# Patient Record
Sex: FEMALE | Race: WHITE | NOT HISPANIC OR LATINO | URBAN - METROPOLITAN AREA
[De-identification: names, ages, dates, MRNs, and addresses within clinical notes are randomized per-mention and may not be internally consistent; named-entity substitution may affect disease eponyms.]

---

## 2024-08-30 ENCOUNTER — HOSPITAL ENCOUNTER (OUTPATIENT)
Dept: RADIOLOGY | Facility: EXTERNAL LOCATION | Age: 34
Discharge: HOME | End: 2024-08-30

## 2024-08-30 DIAGNOSIS — R05.9 COUGH, UNSPECIFIED TYPE: ICD-10-CM

## 2025-01-24 ENCOUNTER — APPOINTMENT (OUTPATIENT)
Dept: OBSTETRICS AND GYNECOLOGY | Facility: CLINIC | Age: 35
End: 2025-01-24
Payer: COMMERCIAL

## 2025-02-14 ENCOUNTER — APPOINTMENT (OUTPATIENT)
Dept: OBSTETRICS AND GYNECOLOGY | Facility: CLINIC | Age: 35
End: 2025-02-14
Payer: COMMERCIAL

## 2025-02-14 VITALS
BODY MASS INDEX: 40.78 KG/M2 | DIASTOLIC BLOOD PRESSURE: 84 MMHG | WEIGHT: 216 LBS | SYSTOLIC BLOOD PRESSURE: 138 MMHG | HEIGHT: 61 IN

## 2025-02-14 DIAGNOSIS — Z12.4 CERVICAL CANCER SCREENING: ICD-10-CM

## 2025-02-14 DIAGNOSIS — Z01.419 WELL WOMAN EXAM: Primary | ICD-10-CM

## 2025-02-14 DIAGNOSIS — E28.2 PCOS (POLYCYSTIC OVARIAN SYNDROME): ICD-10-CM

## 2025-02-14 DIAGNOSIS — N94.6 DYSMENORRHEA: ICD-10-CM

## 2025-02-14 PROCEDURE — 88175 CYTOPATH C/V AUTO FLUID REDO: CPT

## 2025-02-14 PROCEDURE — 87624 HPV HI-RISK TYP POOLED RSLT: CPT

## 2025-02-14 RX ORDER — NORGESTIMATE AND ETHINYL ESTRADIOL 7DAYSX3 LO
1 KIT ORAL DAILY
Qty: 28 TABLET | Refills: 11 | Status: SHIPPED | OUTPATIENT
Start: 2025-02-14 | End: 2026-02-14

## 2025-02-14 NOTE — PROGRESS NOTES
Subjective   Patient ID: Esther Nicole is a 34 y.o. female who presents for Well woman visit.  New patient for annual exam.  34 years old.  No relationship.  Boyfriend.  No pregnancies.  No plans to conceive.  Using withdrawal.  No meds.  She does occasionally vape    Cycles are usually regular.  She works in the dental field.    She had concerns about PCOS, endometriosis, ruptured ovarian cyst.  She was last on birth control pills 5 years ago    On exam breast abdominal pelvic exam performed.  Pap smear obtained.  We reviewed that all of these could be addressed by low-dose oral contraceptives.  Discussed the importance of discontinuing vaping.  Prescription for Tri-Sprintec called in.  Start first day of her next menses.  Reviewed side effects.  Follow-up 3 months.    Well woman exam, concerns regarding PCOS endometriosis and history of ruptured ovarian cysts.  Initiate low-dose oral contraceptives        Review of Systems   All other systems reviewed and are negative.      Objective   Physical Exam  Constitutional:       Appearance: Normal appearance. She is obese.   Chest:   Breasts:     Right: Normal.      Left: Normal.   Genitourinary:     General: Normal vulva.      Vagina: Normal.      Cervix: Normal.      Uterus: Normal.       Adnexa: Right adnexa normal and left adnexa normal.   Neurological:      Mental Status: She is alert.         Assessment/Plan   Well woman exam.  No plans to conceive.  Pap smear obtained.  Concerns for PCOS, endometriosis, ruptured ovarian cyst.  Initiate Tri-Sprintec.  Reviewed side effects.  Follow-up 3 months         Ras Jefferson MD 02/14/25 11:05 AM

## 2025-02-26 ENCOUNTER — OFFICE VISIT (OUTPATIENT)
Dept: URGENT CARE | Age: 35
End: 2025-02-26
Payer: COMMERCIAL

## 2025-02-26 VITALS
WEIGHT: 210 LBS | RESPIRATION RATE: 16 BRPM | OXYGEN SATURATION: 100 % | TEMPERATURE: 98.2 F | HEART RATE: 76 BPM | HEIGHT: 61 IN | SYSTOLIC BLOOD PRESSURE: 134 MMHG | BODY MASS INDEX: 39.65 KG/M2 | DIASTOLIC BLOOD PRESSURE: 84 MMHG

## 2025-02-26 DIAGNOSIS — M54.12 CERVICAL RADICULOPATHY: Primary | ICD-10-CM

## 2025-02-26 DIAGNOSIS — M62.830 SPASM OF RIGHT TRAPEZIUS MUSCLE: ICD-10-CM

## 2025-02-26 PROCEDURE — 3008F BODY MASS INDEX DOCD: CPT | Performed by: PHYSICIAN ASSISTANT

## 2025-02-26 PROCEDURE — 99213 OFFICE O/P EST LOW 20 MIN: CPT | Performed by: PHYSICIAN ASSISTANT

## 2025-02-26 PROCEDURE — 96372 THER/PROPH/DIAG INJ SC/IM: CPT | Performed by: PHYSICIAN ASSISTANT

## 2025-02-26 RX ORDER — BACLOFEN 10 MG/1
10 TABLET ORAL 3 TIMES DAILY
Qty: 15 TABLET | Refills: 0 | Status: SHIPPED | OUTPATIENT
Start: 2025-02-26 | End: 2025-03-03

## 2025-02-26 RX ORDER — PREDNISONE 10 MG/1
TABLET ORAL
Qty: 30 TABLET | Refills: 0 | Status: SHIPPED | OUTPATIENT
Start: 2025-02-26

## 2025-02-26 RX ORDER — KETOROLAC TROMETHAMINE 30 MG/ML
30 INJECTION, SOLUTION INTRAMUSCULAR; INTRAVENOUS ONCE
Status: COMPLETED | OUTPATIENT
Start: 2025-02-26 | End: 2025-02-26

## 2025-02-26 RX ADMIN — KETOROLAC TROMETHAMINE 30 MG: 30 INJECTION, SOLUTION INTRAMUSCULAR; INTRAVENOUS at 10:42

## 2025-02-26 ASSESSMENT — PAIN SCALES - GENERAL: PAINLEVEL_OUTOF10: 10-WORST PAIN EVER

## 2025-02-26 ASSESSMENT — ENCOUNTER SYMPTOMS: BACK PAIN: 1

## 2025-02-26 NOTE — PROGRESS NOTES
"Subjective   Patient ID: Esther Nicole is a 34 y.o. female. They present today with a chief complaint of Back Pain (Patient states she has back pain for 2 months but the last 3 weeks its been at a 10/10 pain. ).    History of Present Illness  Patient is a pleasant 34-year-old white female, no significant past medical history, presented to clinic for to complaint of acute on chronic neck pain.  Patient is reporting approximately 2 years ago she was having some neck pain with radiation to her right upper extremity.  States she had an MRI and was diagnosed with degenerative disc disease.  She presented to clinic today out of concern for 2-week history of worsening pain associated with her right neck/upper back that does radiate down her right upper extremity.  She denies any new trauma or injury.  States this pain is consistent with her previous pain however it is lasting longer than usual.  She has been icing and using heat ibuprofen and Tylenol at home with only mild short-term relief.  She is presented to clinic for further evaluation.  States she does have an appointment with her primary care doctor in a couple of weeks however feels like she was unable to wait.  No further complaints at this time.      Back Pain        Past Medical History  Allergies as of 02/26/2025    (No Known Allergies)       (Not in a hospital admission)         No past medical history on file.    No past surgical history on file.     reports that she has never smoked. She uses smokeless tobacco. She reports current alcohol use. She reports that she does not use drugs.    Review of Systems  Review of Systems   Musculoskeletal:  Positive for back pain.                                  Objective    Vitals:    02/26/25 1027   BP: 134/84   BP Location: Right arm   Patient Position: Sitting   Pulse: 76   Resp: 16   Temp: 36.8 °C (98.2 °F)   TempSrc: Oral   SpO2: 100%   Weight: 95.3 kg (210 lb)   Height: 1.549 m (5' 1\")     Patient's last " menstrual period was 02/10/2025.    Physical Exam  General: Vitals Noted. No distress. Normocephalic.     HEENT: TMs normal, EOMI, normal conjunctiva, patent nares, Normal OP    Neck: Supple with no adenopathy.  There is no midline tenderness to palpations at the C, T, L, or S spines with no underlying palpable deformities or step-offs.  She does have tenderness to palpation in the right cervical paraspinal musculature and into the right superior trapezius with palpable hypertonicity.  Tenderness does extend along the right medial scapular border and into the proximal latissimus.  Neurovascular tact distally cap refill less than 2 seconds full  strength and full strength with shoulder shrug.    Cardiac: Regular Rate and Rhythm. No murmur.     Pulmonary: Equal breath sounds bilaterally. No wheezes, rhonchi, or rales.    Abdomen: Soft, non-tender, with normal bowel sounds.     Musculoskeletal: Moves all extremities, no effusion, no edema.     Skin: No obvious rashes.  Procedures    Point of Care Test & Imaging Results from this visit    No results found.    Diagnostic study results (if any) were reviewed by Stephen Avila PA-C.    Assessment/Plan   Allergies, medications, history, and pertinent labs/EKGs/Imaging reviewed by Stephen Avila PA-C.     Medical Decision Making  Patient was seen eval in the clinic for chief complaint of acute on chronic right sided neck pain with associated radiculopathy.  On exam patient is nontoxic well-appearing respect comfortably no acute distress.  Vital signs are stable, afebrile.  Chest is clear, heart is regular, belly is obese and soft and nontender.  Evaluation of back and neck as above concerning for potentially acute on chronic cervical radiculopathy however I do feel there is a concurrent spasm of the trapezius muscle given the palpable hypertonicity and worsening pain.  Feel this is likely secondary to compensation over the past couple of weeks due to her  underlying chronic pain.  She provided with 30 mg Toradol IM in the clinic.  Will be discharged home with a extended course of prednisone taper and advised to continue alternating Tylenol or Profen as needed.  Also provided short course of muscle relaxer.  Advised to follow with her primary care physician as planned.  Advise she may report to the ED for any new or worsening symptoms.  Reviewed my impression, plan, strict return was report to ED precautions with the patient.  She expresses understanding and agreement plan of care.    Orders and Diagnoses  Diagnoses and all orders for this visit:  Cervical radiculopathy  -     ketorolac (Toradol) injection 30 mg  -     baclofen (Lioresal) 10 mg tablet; Take 1 tablet (10 mg) by mouth 3 times a day for 5 days.  -     predniSONE (Deltasone) 10 mg tablet; Take 4 tablets at 1 time per day for 3 days then take 3 tablets at 1 time per day for 3 days then take 2 tablets 1 time per day for 3 days then take 1 tablet 1 time per day for 3 days  Spasm of right trapezius muscle  -     ketorolac (Toradol) injection 30 mg  -     baclofen (Lioresal) 10 mg tablet; Take 1 tablet (10 mg) by mouth 3 times a day for 5 days.  -     predniSONE (Deltasone) 10 mg tablet; Take 4 tablets at 1 time per day for 3 days then take 3 tablets at 1 time per day for 3 days then take 2 tablets 1 time per day for 3 days then take 1 tablet 1 time per day for 3 days        Medical Admin Record  Administrations This Visit       ketorolac (Toradol) injection 30 mg       Admin Date  02/26/2025 Action  Given Dose  30 mg Route  intramuscular Documented By  Angela Ingram LPN                    Follow Up Instructions  No follow-ups on file.    Patient disposition: Home    Electronically signed by Stephen Avila PA-C  10:46 AM

## 2025-03-10 PROBLEM — J45.40 MODERATE PERSISTENT ASTHMA WITHOUT COMPLICATION (HHS-HCC): Status: ACTIVE | Noted: 2025-03-10

## 2025-03-10 PROBLEM — M54.12 CERVICAL RADICULOPATHY: Status: ACTIVE | Noted: 2025-03-10

## 2025-03-10 PROBLEM — M50.322 DEGENERATION OF C5-C6 INTERVERTEBRAL DISC: Status: ACTIVE | Noted: 2025-03-10

## 2025-03-10 PROBLEM — L20.9 ATOPIC DERMATITIS: Status: ACTIVE | Noted: 2025-03-10

## 2025-03-10 PROBLEM — F41.9 ANXIETY: Status: ACTIVE | Noted: 2025-03-10

## 2025-03-10 PROBLEM — A60.00 GENITAL HERPES SIMPLEX: Status: ACTIVE | Noted: 2025-03-10

## 2025-03-10 ASSESSMENT — ENCOUNTER SYMPTOMS
ABDOMINAL PAIN: 0
DIZZINESS: 0
BLOOD IN STOOL: 0
LIGHT-HEADEDNESS: 0
SHORTNESS OF BREATH: 0
HEADACHES: 0

## 2025-03-10 NOTE — PROGRESS NOTES
Subjective   Patient ID: Esther Nicole is a 34 y.o. female who presents for Annual Exam (Pt is here for physical / nr) and Shoulder Pain (Pt would like to discuss right shoulder pain, for months / nr).    HPI   PMHx significant for asthma, genital herpes, ?PCOS, ?endometriosis    Has been experiencing R arm pain x 2mos. No change in physical activity. No recent falls or injuries. Just woke up one morning with the pain. Pain in the neck is severe, describes as stabbing, radiates down her arm. Has numbness down her entire arm as well as first three fingers. Alternating tylenol + ibuprofen w/minimal relief. No fevers, redness, warmth in the area.    Went to  and was rx'd muscle relaxer + prednisone. Given 30mg injection of toradol w/no relief.    HM: pap 2/14/25    Review of Systems   HENT:  Negative for hearing loss.    Eyes:  Negative for visual disturbance.   Respiratory:  Negative for shortness of breath.    Cardiovascular:  Negative for chest pain.   Gastrointestinal:  Negative for abdominal pain and blood in stool.   Genitourinary:  Negative for vaginal bleeding.   Musculoskeletal:  Positive for arthralgias and neck pain.   Neurological:  Negative for dizziness, light-headedness and headaches.       Objective   /82   Pulse 91   Wt 99.1 kg (218 lb 6.4 oz)   LMP 02/10/2025   SpO2 98%   BMI 41.27 kg/m²     Physical Exam  Vitals reviewed.   Constitutional:       Appearance: Normal appearance.   HENT:      Head: Normocephalic and atraumatic.      Right Ear: Tympanic membrane and ear canal normal.      Left Ear: Tympanic membrane and ear canal normal.      Nose: Nose normal.      Mouth/Throat:      Mouth: Mucous membranes are moist.      Pharynx: No oropharyngeal exudate.   Eyes:      Extraocular Movements: Extraocular movements intact.      Conjunctiva/sclera: Conjunctivae normal.      Pupils: Pupils are equal, round, and reactive to light.   Cardiovascular:      Rate and Rhythm: Normal rate and  regular rhythm.      Heart sounds: Normal heart sounds.   Pulmonary:      Effort: Pulmonary effort is normal.      Breath sounds: Normal breath sounds. No wheezing.   Abdominal:      General: There is no distension.      Palpations: Abdomen is soft.      Tenderness: There is no abdominal tenderness.   Musculoskeletal:         General: No tenderness.      Cervical back: Normal range of motion and neck supple.      Comments: Decreased ROM of the neck, difficulty w/flexion + R lateral rotation. TTP R posterior shoulder blade. 4/5 strength b/l upper extremities   Skin:     General: Skin is warm and dry.      Findings: No rash.   Neurological:      General: No focal deficit present.      Mental Status: She is alert. Mental status is at baseline.   Psychiatric:         Mood and Affect: Mood normal.         Assessment/Plan   Problem List Items Addressed This Visit             ICD-10-CM    Cervical radiculopathy M54.12    Relevant Medications    gabapentin (Neurontin) 300 mg capsule    Other Relevant Orders    MR cervical spine wo IV contrast    Referral to Physical Therapy     Other Visit Diagnoses         Codes    Routine medical exam    -  Primary Z00.00    Screening for diabetes mellitus     Z13.1    Relevant Orders    Comprehensive Metabolic Panel    Hemoglobin A1C    Lipid screening     Z13.220    Relevant Orders    Lipid Panel    Vitamin D deficiency     E55.9    Relevant Orders    Vitamin D 25-Hydroxy,Total (for eval of Vitamin D levels)    Screening for hematuria or proteinuria     Z13.89    Relevant Orders    Urinalysis with Reflex Culture and Microscopic    Paresthesias     R20.2    Relevant Orders    MR cervical spine wo IV contrast    Neck pain     M54.2    Relevant Orders    MR cervical spine wo IV contrast    Referral to Physical Therapy    Right arm pain     M79.601    Relevant Orders    MR cervical spine wo IV contrast    Referral to Physical Therapy

## 2025-03-14 ENCOUNTER — OFFICE VISIT (OUTPATIENT)
Dept: PRIMARY CARE | Facility: CLINIC | Age: 35
End: 2025-03-14
Payer: COMMERCIAL

## 2025-03-14 VITALS
BODY MASS INDEX: 41.27 KG/M2 | WEIGHT: 218.4 LBS | HEART RATE: 91 BPM | SYSTOLIC BLOOD PRESSURE: 124 MMHG | OXYGEN SATURATION: 98 % | DIASTOLIC BLOOD PRESSURE: 82 MMHG

## 2025-03-14 DIAGNOSIS — M54.2 NECK PAIN: ICD-10-CM

## 2025-03-14 DIAGNOSIS — Z00.00 ROUTINE MEDICAL EXAM: Primary | ICD-10-CM

## 2025-03-14 DIAGNOSIS — M79.601 RIGHT ARM PAIN: ICD-10-CM

## 2025-03-14 DIAGNOSIS — R20.2 PARESTHESIAS: ICD-10-CM

## 2025-03-14 DIAGNOSIS — E55.9 VITAMIN D DEFICIENCY: ICD-10-CM

## 2025-03-14 DIAGNOSIS — Z13.220 LIPID SCREENING: ICD-10-CM

## 2025-03-14 DIAGNOSIS — Z13.1 SCREENING FOR DIABETES MELLITUS: ICD-10-CM

## 2025-03-14 DIAGNOSIS — M54.12 CERVICAL RADICULOPATHY: ICD-10-CM

## 2025-03-14 DIAGNOSIS — Z13.89 SCREENING FOR HEMATURIA OR PROTEINURIA: ICD-10-CM

## 2025-03-14 PROCEDURE — 99395 PREV VISIT EST AGE 18-39: CPT | Performed by: PHYSICIAN ASSISTANT

## 2025-03-14 RX ORDER — GABAPENTIN 300 MG/1
300 CAPSULE ORAL 2 TIMES DAILY
Qty: 60 CAPSULE | Refills: 2 | Status: SHIPPED | OUTPATIENT
Start: 2025-03-14 | End: 2025-09-10

## 2025-03-14 ASSESSMENT — COLUMBIA-SUICIDE SEVERITY RATING SCALE - C-SSRS
1. IN THE PAST MONTH, HAVE YOU WISHED YOU WERE DEAD OR WISHED YOU COULD GO TO SLEEP AND NOT WAKE UP?: NO
6. HAVE YOU EVER DONE ANYTHING, STARTED TO DO ANYTHING, OR PREPARED TO DO ANYTHING TO END YOUR LIFE?: NO
2. HAVE YOU ACTUALLY HAD ANY THOUGHTS OF KILLING YOURSELF?: NO

## 2025-03-14 ASSESSMENT — PAIN SCALES - GENERAL: PAINLEVEL_OUTOF10: 10-WORST PAIN EVER

## 2025-03-14 ASSESSMENT — PATIENT HEALTH QUESTIONNAIRE - PHQ9
2. FEELING DOWN, DEPRESSED OR HOPELESS: NOT AT ALL
1. LITTLE INTEREST OR PLEASURE IN DOING THINGS: NOT AT ALL
SUM OF ALL RESPONSES TO PHQ9 QUESTIONS 1 AND 2: 0

## 2025-03-14 ASSESSMENT — ENCOUNTER SYMPTOMS
ARTHRALGIAS: 1
NECK PAIN: 1

## 2025-03-28 ENCOUNTER — HOSPITAL ENCOUNTER (OUTPATIENT)
Dept: RADIOLOGY | Facility: CLINIC | Age: 35
Discharge: HOME | End: 2025-03-28
Payer: COMMERCIAL

## 2025-03-28 DIAGNOSIS — M79.601 RIGHT ARM PAIN: ICD-10-CM

## 2025-03-28 DIAGNOSIS — M54.2 NECK PAIN: ICD-10-CM

## 2025-03-28 DIAGNOSIS — R20.2 PARESTHESIAS: ICD-10-CM

## 2025-03-28 DIAGNOSIS — M54.12 CERVICAL RADICULOPATHY: ICD-10-CM

## 2025-03-28 PROCEDURE — 72141 MRI NECK SPINE W/O DYE: CPT

## 2025-03-29 LAB
25(OH)D3+25(OH)D2 SERPL-MCNC: 26 NG/ML (ref 30–100)
ALBUMIN SERPL-MCNC: 4.4 G/DL (ref 3.6–5.1)
ALP SERPL-CCNC: 60 U/L (ref 31–125)
ALT SERPL-CCNC: 19 U/L (ref 6–29)
ANION GAP SERPL CALCULATED.4IONS-SCNC: 7 MMOL/L (CALC) (ref 7–17)
APPEARANCE UR: CLEAR
AST SERPL-CCNC: 12 U/L (ref 10–30)
BACTERIA #/AREA URNS HPF: ABNORMAL /HPF
BACTERIA UR CULT: ABNORMAL
BILIRUB SERPL-MCNC: 0.8 MG/DL (ref 0.2–1.2)
BILIRUB UR QL STRIP: NEGATIVE
BUN SERPL-MCNC: 17 MG/DL (ref 7–25)
CALCIUM SERPL-MCNC: 9.4 MG/DL (ref 8.6–10.2)
CHLORIDE SERPL-SCNC: 102 MMOL/L (ref 98–110)
CHOLEST SERPL-MCNC: 236 MG/DL
CHOLEST/HDLC SERPL: 5 (CALC)
CO2 SERPL-SCNC: 30 MMOL/L (ref 20–32)
COLOR UR: YELLOW
CREAT SERPL-MCNC: 0.68 MG/DL (ref 0.5–0.97)
EGFRCR SERPLBLD CKD-EPI 2021: 119 ML/MIN/1.73M2
EST. AVERAGE GLUCOSE BLD GHB EST-MCNC: 105 MG/DL
EST. AVERAGE GLUCOSE BLD GHB EST-SCNC: 5.8 MMOL/L
GLUCOSE SERPL-MCNC: 84 MG/DL (ref 65–99)
GLUCOSE UR QL STRIP: NEGATIVE
HBA1C MFR BLD: 5.3 % OF TOTAL HGB
HDLC SERPL-MCNC: 47 MG/DL
HGB UR QL STRIP: ABNORMAL
HYALINE CASTS #/AREA URNS LPF: ABNORMAL /LPF
KETONES UR QL STRIP: NEGATIVE
LDLC SERPL CALC-MCNC: 158 MG/DL (CALC)
LEUKOCYTE ESTERASE UR QL STRIP: NEGATIVE
NITRITE UR QL STRIP: NEGATIVE
NONHDLC SERPL-MCNC: 189 MG/DL (CALC)
PH UR STRIP: ABNORMAL [PH] (ref 5–8)
POTASSIUM SERPL-SCNC: 4.3 MMOL/L (ref 3.5–5.3)
PROT SERPL-MCNC: 6.7 G/DL (ref 6.1–8.1)
PROT UR QL STRIP: NEGATIVE
RBC #/AREA URNS HPF: ABNORMAL /HPF
SERVICE CMNT-IMP: ABNORMAL
SODIUM SERPL-SCNC: 139 MMOL/L (ref 135–146)
SP GR UR STRIP: 1.02 (ref 1–1.03)
SQUAMOUS #/AREA URNS HPF: ABNORMAL /HPF
TRIGL SERPL-MCNC: 179 MG/DL
WBC #/AREA URNS HPF: ABNORMAL /HPF

## 2025-03-31 ENCOUNTER — PATIENT MESSAGE (OUTPATIENT)
Dept: PRIMARY CARE | Facility: CLINIC | Age: 35
End: 2025-03-31
Payer: COMMERCIAL

## 2025-03-31 ENCOUNTER — TELEPHONE (OUTPATIENT)
Dept: PRIMARY CARE | Facility: CLINIC | Age: 35
End: 2025-03-31
Payer: COMMERCIAL

## 2025-03-31 DIAGNOSIS — M50.20 CERVICAL DISC DISPLACEMENT: ICD-10-CM

## 2025-03-31 DIAGNOSIS — M54.12 CERVICAL RADICULOPATHY: Primary | ICD-10-CM

## 2025-03-31 NOTE — TELEPHONE ENCOUNTER
Relay message to pt through [facility]. Pt replied back stating that she has been in pain for months, the pain management Dr cannot get her in until May. Pt is still having horrible pains and barely making it everyday with ibuprofen and 600mg daily of gabapentin.

## 2025-03-31 NOTE — TELEPHONE ENCOUNTER
Labs:  Cholesterol is elevated but no need for medication at this time. Does need to watch intake of greasy foods, red meat, processed foods, sugar, starchy carbs. Mediterranean diet is best. Recommend increasing exercise to 150 mins per week.    Vitamin D is low - recommend 1000IU vitamin D3 daily    Urinalysis showed 1+ blood but there were no actually RBC in the urine, so nothing to worry about at this time.    A1c stable, not in prediabetes range    Normal glucose, kidney/liver function, and electrolytes      Imaging:  Does show bulging discs which is likely causing her arm pain. Recommend FU with pain management. Hold off on starting physical therapy until she sees them and they are in agreement with plan. Referral placed.

## 2025-03-31 NOTE — TELEPHONE ENCOUNTER
I can refer her to spinal surgery to see if they can get her in sooner but there's not much more I can do. May need to travel further to be seen sooner - would need to discuss this with centralized scheduling. She can also put herself on a cancellation list for pain management.

## 2025-05-02 ENCOUNTER — APPOINTMENT (OUTPATIENT)
Dept: PAIN MEDICINE | Facility: CLINIC | Age: 35
End: 2025-05-02
Payer: COMMERCIAL

## 2025-05-09 ENCOUNTER — APPOINTMENT (OUTPATIENT)
Dept: OBSTETRICS AND GYNECOLOGY | Facility: CLINIC | Age: 35
End: 2025-05-09
Payer: COMMERCIAL

## 2025-05-09 VITALS
HEIGHT: 61 IN | WEIGHT: 222 LBS | DIASTOLIC BLOOD PRESSURE: 82 MMHG | SYSTOLIC BLOOD PRESSURE: 120 MMHG | BODY MASS INDEX: 41.91 KG/M2

## 2025-05-09 DIAGNOSIS — N80.9 ENDOMETRIOSIS: ICD-10-CM

## 2025-05-09 DIAGNOSIS — Z87.42 HISTORY OF OVARIAN CYST: ICD-10-CM

## 2025-05-09 DIAGNOSIS — E28.2 PCOS (POLYCYSTIC OVARIAN SYNDROME): Primary | ICD-10-CM

## 2025-05-09 PROCEDURE — 99213 OFFICE O/P EST LOW 20 MIN: CPT | Performed by: OBSTETRICS & GYNECOLOGY

## 2025-05-09 PROCEDURE — 3008F BODY MASS INDEX DOCD: CPT | Performed by: OBSTETRICS & GYNECOLOGY

## 2025-05-09 RX ORDER — NORGESTIMATE AND ETHINYL ESTRADIOL 7DAYSX3 LO
1 KIT ORAL DAILY
Qty: 28 TABLET | Refills: 11 | Status: SHIPPED | OUTPATIENT
Start: 2025-05-09 | End: 2026-05-09

## 2025-05-09 NOTE — PROGRESS NOTES
"Subjective   Patient ID: Esther Nicole \"Bebe\" is a 34 y.o. female who presents for consult.  Review of last note       Ras Jefferson MD  Physician  Obstetrics     Progress Notes     Signed     Encounter Date: 2/14/2025    Signed     Expand All Collapse All    Subjective  Patient ID: Esther Nicole is a 34 y.o. female who presents for Well woman visit.  New patient for annual exam.  34 years old.  No relationship.  Boyfriend.  No pregnancies.  No plans to conceive.  Using withdrawal.  No meds.  She does occasionally vape     Cycles are usually regular.  She works in the dental field.     She had concerns about PCOS, endometriosis, ruptured ovarian cyst.  She was last on birth control pills 5 years ago     On exam breast abdominal pelvic exam performed.  Pap smear obtained.  We reviewed that all of these could be addressed by low-dose oral contraceptives.  Discussed the importance of discontinuing vaping.  Prescription for Tri-Sprintec called in.  Start first day of her next menses.  Reviewed side effects.  Follow-up 3 months.     Well woman exam, concerns regarding PCOS endometriosis and history of ruptured ovarian cysts.  Initiate low-dose oral contraceptives           Review of Systems   All other systems reviewed and are negative.        Objective  Physical Exam  Constitutional:       Appearance: Normal appearance. She is obese.   Chest:   Breasts:     Right: Normal.      Left: Normal.   Genitourinary:     General: Normal vulva.      Vagina: Normal.      Cervix: Normal.      Uterus: Normal.       Adnexa: Right adnexa normal and left adnexa normal.   Neurological:      Mental Status: She is alert.            Assessment/Plan  Well woman exam.  No plans to conceive.  Pap smear obtained.  Concerns for PCOS, endometriosis, ruptured ovarian cyst.  Initiate Tri-Sprintec.  Reviewed side effects.  Follow-up 3 months        Ras Jefferson MD 02/14/25 11:05 AM           Started on low-dose oral contraceptives.  Her " first cycle was delayed by a week or 2 and had a lot more pain but the cycles are now regulating.  Just finishing her cycle now no missed pills.    We have talked about ovarian cysts preventing them with oral contraceptives, PCOS and obtaining hormonal balance.  Endometriosis..  Comfortable staying on them for now.  No significant side effects.  Follow-up in 6 months        Review of Systems   Genitourinary:  Positive for menstrual problem and pelvic pain.       Objective   Physical Exam  Constitutional:       Appearance: Normal appearance. She is obese.   Neurological:      Mental Status: She is alert.         Assessment/Plan   History of PCOS, ovarian cyst, questionable endometriosis.  Started on OCP.  No significant side effects.  Taking daily.  Refill and follow-up in 6 months         Ras Jefferson MD 05/09/25 10:56 AM

## 2025-05-22 ENCOUNTER — TELEPHONE (OUTPATIENT)
Dept: PRIMARY CARE | Facility: CLINIC | Age: 35
End: 2025-05-22
Payer: COMMERCIAL

## 2025-05-22 DIAGNOSIS — M50.20 CERVICAL DISC DISPLACEMENT: ICD-10-CM

## 2025-05-22 DIAGNOSIS — M54.12 CERVICAL RADICULOPATHY: Primary | ICD-10-CM

## 2025-05-29 ENCOUNTER — OFFICE VISIT (OUTPATIENT)
Dept: ORTHOPEDIC SURGERY | Facility: HOSPITAL | Age: 35
End: 2025-05-29
Payer: COMMERCIAL

## 2025-05-29 ENCOUNTER — HOSPITAL ENCOUNTER (OUTPATIENT)
Dept: RADIOLOGY | Facility: HOSPITAL | Age: 35
Discharge: HOME | End: 2025-05-29
Payer: COMMERCIAL

## 2025-05-29 DIAGNOSIS — M54.12 CERVICAL RADICULOPATHY: ICD-10-CM

## 2025-05-29 DIAGNOSIS — M50.10 HERNIATION OF CERVICAL INTERVERTEBRAL DISC WITH RADICULOPATHY: ICD-10-CM

## 2025-05-29 DIAGNOSIS — M99.41 CONNECTIVE TISSUE STENOSIS OF NEURAL CANAL OF CERVICAL REGION: Primary | ICD-10-CM

## 2025-05-29 PROCEDURE — 72050 X-RAY EXAM NECK SPINE 4/5VWS: CPT | Performed by: RADIOLOGY

## 2025-05-29 PROCEDURE — 99214 OFFICE O/P EST MOD 30 MIN: CPT | Performed by: ORTHOPAEDIC SURGERY

## 2025-05-29 PROCEDURE — 99204 OFFICE O/P NEW MOD 45 MIN: CPT | Performed by: ORTHOPAEDIC SURGERY

## 2025-05-29 PROCEDURE — 72050 X-RAY EXAM NECK SPINE 4/5VWS: CPT

## 2025-05-29 RX ORDER — CEFAZOLIN SODIUM 2 G/50ML
2 SOLUTION INTRAVENOUS ONCE
OUTPATIENT
Start: 2025-05-29 | End: 2025-05-29

## 2025-05-29 NOTE — PROGRESS NOTES
Chief Complaint: Neck pain and right radicular arm pain    HPI: Esther Nicole is a 34 y.o. year old right-hand-dominant female patient otherwise healthy who works as a dental assistant who was seen in our clinic today with referral from Viri Mazariegos for cervical radiculopathy.  She states that her symptoms started in January 2025 where she was having neck pain and pain that radiate down the right arm particularly around the right triceps forearm and periscapular region.  She is also started to develop numbness in her hands more so involving the thumb index and long finger.  She is also noticed that she has been dropping things at work.  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  She spends a lot of time looking down and with this pain she is unable to look down for long peers of time.  Looking down causes sharp pain going down her arm but also down her back.  Otherwise asymptomatic on the left side.  No history of cervical spine surgery.  She did see pain management and had an epidural steroid injection on April 18, 2025 which provided about 3 weeks of relief and then the pain came back.  She is scheduled for physical therapy however she is unable to tolerate it due to so much pain with any range of motion of her neck.  She denies any myelopathic symptoms such as gait instability or frequent falls.    No anticoagulations  She does not smoke tobacco or use nicotine products.    Review of Systems    All other systems have been reviewed and are negative for complaint. All pertinent positive and negative as listed in history of present illness.    Medical History[1]     Surgical History[2]     RX Allergies[3]     Medications Ordered Prior to Encounter[4]         PE:     BMI: 41.95  General: Patient appears well-nourished and well-developed in no acute distress, Alert and Oriented x3  Psych: Pleasant mood and affect  HEENT: Extraocular muscles intact, pupils equal and round. Sclerae anicteric   Cardio:  extremities warm and well perfused  Resp: unlabored symmetric breathing  Skin: no open wounds or rash  Musculoskeletal/Neuro Exam: Normal gait.  She has some mild difficulty with tandem and heel-to-toe gait.  Diffuse tenderness to palpation along the right paraspinal and parascapular region.  Pain and stiffness with cervical range of motion she holds her head in a slightly flexed position because extending her neck causes severe pain down her arm and back..  Positive Spurlings on the right negative on the left.  Positive Lhermitte's Sign    Upper extremity: Negative Tinel negative Armstrong over the right carpal tunnel    Motor: Right upper extremity was 4 out of 5 strength with shoulder abduction, elbow flexion and extension, wrist flexion and extension and  against resistance  Left upper extremity with 5 out of 5 strength with shoulder abduction, elbow flexion and extension, wrist flexion and extension and  against resistance    Sensation to light touch intact along C5-T1 distribution bilaterally but diminished in the right hand along the thumb index and long finger.    Reflex: 2+ brachioradialis and biceps bilaterally    Upper Motor Signs: Negative Seth sign bilaterally    Lower extremity    Motor: Right leg with 5 out of 5 motor strength with hip flexion, knee extension, ankle dorsiflexion plantarflexion EHL against resistance  Left leg with 5 out of 5 motor strength with hip flexion, knee extension, ankle dorsiflexion plantarflexion EHL against resistance    Sensation to light touch intact along L2-S1 distribution bilaterally    2+ patella and Achilles Reflex          Imaging:    I personally reviewed xray of the cervical spine obtained in clinic today. AP, Lateral, flexion and extension xrays were reviewed. No evidence of acute fracture or dislocation. There is focal kyphosis at C5-C6 that does not seem to improve with flexion extension.  There is disc space narrowing at C5-C6 C6-C7.    I reviewed the  MRI of the cervical spine from March 28, 2025.  MRI shows disc bulge and disc protrusion and extrusion at C5-C6 greater on the left than the right causing moderate central canal stenosis and flatting of the cord.  At C6-C7 there is a right sided disc extrusion causing moderate central canal stenosis and flattening of the cord.    A/P: Esther Nicole is a 34 y.o. year old female patient with 6-month history of right radicular arm pain secondary to C5-C6 C6-C7 disc herniation causing moderate canal stenosis as seen on MRI.  She has positive Spurling and positive Lhermitte sign on exam today.  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  She underwent epidural steroid injection which provided only temporary relief.  I do not think that she will be able to tolerate physical therapy due to positive Lhermitte sign and Spurling sign that exacerbates her pain with any movement of her neck.  She holds her neck in a slightly flexed position because of the pain.  Given her physical exam findings with right arm weakness and positive Lhermitte sign and MRI findings for disc herniation with canal stenosis and flatting of the spinal cord I would recommend surgery for C5-C6 C6-C7 anterior cervical discectomy and fusion decompress the neural elements.  I would like for her to get a CT scan of the cervical spine for preoperative planning to make sure that she does not have OPLL or a calcified disc that would prevent me from going anteriorly.  She was given order for this today.    I discussed the risks of surgery which includes but not limited to infection at the surgical site or wound healing requiring additional surgery to clean the infection and long term IV antibiotics. There is risk of blood loss requiring blood transfusion. Risk of dural tear requiring repair and possible continue cerebral spinal fluid leakage and positional headaches. Risk of Nerve  root injury resulting in temporary or permeant weakness, numbness, or radicular pain such as a nerve root palsy. Risk of epidural hematoma and spinal cord compression resulting in weakness in extremity and bowel and bladder disturbances requiring additional surgery to clear out hematoma. Risk of retropharyngeal hematoma causing airway comprise and need for emergent surgery for evacuation. Risk of nonunion or hardware failure requiring additional surgery to correct this. Risk of adjacent level disease in the future requiring additional surgery in the future to address this. Risk of needing to remain intubated after surgery for facial swelling. Risk of dysphagia or difficulty swallowing requiring feeding tube breifly. Risks of developing MI, stroke, DVT, PE during or after surgery. Risk of death. Risk of vascular injury or esophageal injury. Risk of complications from anesthesia requiring prolong intubation. Risk of birgit coronavirus and the complications associated with that while in the hospital.     After our discussion patient elected to proceed with surgery.  Written consent was obtained in clinic today.  She will need preadmission testing.    Surgery will be with Medtronic Zevo plate.  I will be using structural allograft for interbody fusion.  Allograft will be filled with DBM putty.    After our discussion, the patient articulated understanding of the plan and felt that all questions had been answered satisfactorily. The patient was pleased with the visit and very appreciative for the care rendered.    **Please excuse any errors in grammar or translation related to this dictation. Voice recognition software was utilized to prepare this document. **                    Angela Collado MD    Department of Orthopaedic Surgery  [facility]  [email or web address]          [1] History reviewed. No pertinent past medical history.  [2] History  reviewed. No pertinent surgical history.  [3] No Known Allergies  [4]   Current Outpatient Medications on File Prior to Visit   Medication Sig Dispense Refill    gabapentin (Neurontin) 300 mg capsule Take 1 capsule (300 mg) by mouth 2 times a day. 60 capsule 2    norgestimate-ethinyl estradioL (Ortho Tri-Cyclen LO) 0.18/0.215/0.25 mg-0.025 mg tablet Take 1 tablet by mouth once daily. 28 tablet 11    norgestimate-ethinyl estradioL (Ortho Tri-Cyclen LO) 0.18/0.215/0.25 mg-25 mcg tablet Take 1 tablet by mouth once daily. 28 tablet 11     No current facility-administered medications on file prior to visit.

## 2025-05-29 NOTE — LETTER
May 29, 2025     Viri Mazariegos PA-C  [address] [address]  [place] [address]    Patient: Bebe Nicole   YOB: 1990   Date of Visit: 5/29/2025       Dear Dr. Viri Mazariegos PA-C:    Thank you for referring Bebe Nicole to me for evaluation. Below are my notes for this consultation.  If you have questions, please do not hesitate to call me. I look forward to following your patient along with you.       Sincerely,     Angela Collado MD      CC: No Recipients  ______________________________________________________________________________________    Chief Complaint: Neck pain and right radicular arm pain    HPI: Esther Nicole is a 34 y.o. year old right-hand-dominant female patient otherwise healthy who works as a dental assistant who was seen in our clinic today with referral from Viri Mazariegos for cervical radiculopathy.  She states that her symptoms started in January 2025 where she was having neck pain and pain that radiate down the right arm particularly around the right triceps forearm and periscapular region.  She is also started to develop numbness in her hands more so involving the thumb index and long finger.  She is also noticed that she has been dropping things at work.  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  She spends a lot of time looking down and with this pain she is unable to look down for long peers of time.  Looking down causes sharp pain going down her arm but also down her back.  Otherwise asymptomatic on the left side.  No history of cervical spine surgery.  She did see pain management and had an epidural steroid injection on April 18, 2025 which provided about 3 weeks of relief and then the pain came back.  She is scheduled for physical therapy however she is unable to tolerate it due to so much pain with any range of motion of her neck.  She denies any myelopathic symptoms such as gait instability or frequent falls.    No anticoagulations  She  does not smoke tobacco or use nicotine products.    Review of Systems    All other systems have been reviewed and are negative for complaint. All pertinent positive and negative as listed in history of present illness.    Medical History[1]     Surgical History[2]     RX Allergies[3]     Medications Ordered Prior to Encounter[4]         PE:     BMI: 41.95  General: Patient appears well-nourished and well-developed in no acute distress, Alert and Oriented x3  Psych: Pleasant mood and affect  HEENT: Extraocular muscles intact, pupils equal and round. Sclerae anicteric   Cardio: extremities warm and well perfused  Resp: unlabored symmetric breathing  Skin: no open wounds or rash  Musculoskeletal/Neuro Exam: Normal gait.  She has some mild difficulty with tandem and heel-to-toe gait.  Diffuse tenderness to palpation along the right paraspinal and parascapular region.  Pain and stiffness with cervical range of motion she holds her head in a slightly flexed position because extending her neck causes severe pain down her arm and back..  Positive Spurlings on the right negative on the left.  Positive Lhermitte's Sign    Upper extremity: Negative Tinel negative [place] over the right carpal tunnel    Motor: Right upper extremity was 4 out of 5 strength with shoulder abduction, elbow flexion and extension, wrist flexion and extension and  against resistance  Left upper extremity with 5 out of 5 strength with shoulder abduction, elbow flexion and extension, wrist flexion and extension and  against resistance    Sensation to light touch intact along C5-T1 distribution bilaterally but diminished in the right hand along the thumb index and long finger.    Reflex: 2+ brachioradialis and biceps bilaterally    Upper Motor Signs: Negative Seth sign bilaterally    Lower extremity    Motor: Right leg with 5 out of 5 motor strength with hip flexion, knee extension, ankle dorsiflexion plantarflexion EHL against  resistance  Left leg with 5 out of 5 motor strength with hip flexion, knee extension, ankle dorsiflexion plantarflexion EHL against resistance    Sensation to light touch intact along L2-S1 distribution bilaterally    2+ patella and Achilles Reflex          Imaging:    I personally reviewed xray of the cervical spine obtained in clinic today. AP, Lateral, flexion and extension xrays were reviewed. No evidence of acute fracture or dislocation. There is focal kyphosis at C5-C6 that does not seem to improve with flexion extension.  There is disc space narrowing at C5-C6 C6-C7.    I reviewed the MRI of the cervical spine from March 28, 2025.  MRI shows disc bulge and disc protrusion and extrusion at C5-C6 greater on the left than the right causing moderate central canal stenosis and flatting of the cord.  At C6-C7 there is a right sided disc extrusion causing moderate central canal stenosis and flattening of the cord.    A/P: Esther Nicole is a 34 y.o. year old female patient with 6-month history of right radicular arm pain secondary to C5-C6 C6-C7 disc herniation causing moderate canal stenosis as seen on MRI.  She has positive Spurling and positive Lhermitte sign on exam today.  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  She underwent epidural steroid injection which provided only temporary relief.  I do not think that she will be able to tolerate physical therapy due to positive Lhermitte sign and Spurling sign that exacerbates her pain with any movement of her neck.  She holds her neck in a slightly flexed position because of the pain.  Given her physical exam findings with right arm weakness and positive Lhermitte sign and MRI findings for disc herniation with canal stenosis and flatting of the spinal cord I would recommend surgery for C5-C6 C6-C7 anterior cervical discectomy and fusion decompress the neural elements.  I would like for  her to get a CT scan of the cervical spine for preoperative planning to make sure that she does not have OPLL or a calcified disc that would prevent me from going anteriorly.  She was given order for this today.    I discussed the risks of surgery which includes but not limited to infection at the surgical site or wound healing requiring additional surgery to clean the infection and long term IV antibiotics. There is risk of blood loss requiring blood transfusion. Risk of dural tear requiring repair and possible continue cerebral spinal fluid leakage and positional headaches. Risk of Nerve root injury resulting in temporary or permeant weakness, numbness, or radicular pain such as a nerve root palsy. Risk of epidural hematoma and spinal cord compression resulting in weakness in extremity and bowel and bladder disturbances requiring additional surgery to clear out hematoma. Risk of retropharyngeal hematoma causing airway comprise and need for emergent surgery for evacuation. Risk of nonunion or hardware failure requiring additional surgery to correct this. Risk of adjacent level disease in the future requiring additional surgery in the future to address this. Risk of needing to remain intubated after surgery for facial swelling. Risk of dysphagia or difficulty swallowing requiring feeding tube breifly. Risks of developing MI, stroke, DVT, PE during or after surgery. Risk of death. Risk of vascular injury or esophageal injury. Risk of complications from anesthesia requiring prolong intubation. Risk of birgit coronavirus and the complications associated with that while in the hospital.     After our discussion patient elected to proceed with surgery.  Written consent was obtained in clinic today.  She will need preadmission testing.    Surgery will be with Medtronic Zevo plate.  I will be using structural allograft for interbody fusion.  Allograft will be filled with DBM putty.    After our discussion, the patient  articulated understanding of the plan and felt that all questions had been answered satisfactorily. The patient was pleased with the visit and very appreciative for the care rendered.    **Please excuse any errors in grammar or translation related to this dictation. Voice recognition software was utilized to prepare this document. **                    Angela Collado MD    Department of Orthopaedic Surgery  [facility]  [email or web address]          [1]  History reviewed. No pertinent past medical history.  [2]  History reviewed. No pertinent surgical history.  [3]  No Known Allergies  [4]  Current Outpatient Medications on File Prior to Visit   Medication Sig Dispense Refill   • gabapentin (Neurontin) 300 mg capsule Take 1 capsule (300 mg) by mouth 2 times a day. 60 capsule 2   • norgestimate-ethinyl estradioL (Ortho Tri-Cyclen LO) 0.18/0.215/0.25 mg-0.025 mg tablet Take 1 tablet by mouth once daily. 28 tablet 11   • norgestimate-ethinyl estradioL (Ortho Tri-Cyclen LO) 0.18/0.215/0.25 mg-25 mcg tablet Take 1 tablet by mouth once daily. 28 tablet 11     No current facility-administered medications on file prior to visit.

## 2025-05-30 ENCOUNTER — HOSPITAL ENCOUNTER (OUTPATIENT)
Dept: RADIOLOGY | Facility: CLINIC | Age: 35
Discharge: HOME | End: 2025-05-30
Payer: COMMERCIAL

## 2025-05-30 DIAGNOSIS — M54.12 CERVICAL RADICULOPATHY: ICD-10-CM

## 2025-05-30 PROCEDURE — 72125 CT NECK SPINE W/O DYE: CPT | Performed by: RADIOLOGY

## 2025-05-30 PROCEDURE — 72125 CT NECK SPINE W/O DYE: CPT

## 2025-06-01 ENCOUNTER — ANESTHESIA EVENT (OUTPATIENT)
Dept: OPERATING ROOM | Facility: HOSPITAL | Age: 35
End: 2025-06-01
Payer: COMMERCIAL

## 2025-06-03 ENCOUNTER — APPOINTMENT (OUTPATIENT)
Dept: PHYSICAL THERAPY | Facility: CLINIC | Age: 35
End: 2025-06-03

## 2025-06-06 ENCOUNTER — PRE-ADMISSION TESTING (OUTPATIENT)
Dept: PREADMISSION TESTING | Facility: HOSPITAL | Age: 35
End: 2025-06-06
Payer: COMMERCIAL

## 2025-06-06 VITALS
TEMPERATURE: 97.8 F | BODY MASS INDEX: 41.61 KG/M2 | OXYGEN SATURATION: 98 % | HEART RATE: 68 BPM | DIASTOLIC BLOOD PRESSURE: 87 MMHG | WEIGHT: 220.4 LBS | SYSTOLIC BLOOD PRESSURE: 126 MMHG | HEIGHT: 61 IN

## 2025-06-06 DIAGNOSIS — M54.12 CERVICAL RADICULOPATHY: ICD-10-CM

## 2025-06-06 DIAGNOSIS — Z01.818 PREOPERATIVE EXAMINATION: Primary | ICD-10-CM

## 2025-06-06 DIAGNOSIS — M99.41 CONNECTIVE TISSUE STENOSIS OF NEURAL CANAL OF CERVICAL REGION: ICD-10-CM

## 2025-06-06 DIAGNOSIS — F17.200 TOBACCO USE DISORDER: ICD-10-CM

## 2025-06-06 DIAGNOSIS — E66.01 MORBID OBESITY WITH BMI OF 40.0-44.9, ADULT (MULTI): ICD-10-CM

## 2025-06-06 DIAGNOSIS — M50.10 HERNIATION OF CERVICAL INTERVERTEBRAL DISC WITH RADICULOPATHY: ICD-10-CM

## 2025-06-06 LAB
ABO GROUP (TYPE) IN BLOOD: NORMAL
ANION GAP SERPL CALC-SCNC: 14 MMOL/L (ref 10–20)
ANTIBODY SCREEN: NORMAL
APTT PPP: 29 SECONDS (ref 26–36)
BUN SERPL-MCNC: 18 MG/DL (ref 6–23)
CALCIUM SERPL-MCNC: 9.3 MG/DL (ref 8.6–10.6)
CHLORIDE SERPL-SCNC: 104 MMOL/L (ref 98–107)
CO2 SERPL-SCNC: 28 MMOL/L (ref 21–32)
CREAT SERPL-MCNC: 0.84 MG/DL (ref 0.5–1.05)
EGFRCR SERPLBLD CKD-EPI 2021: >90 ML/MIN/1.73M*2
ERYTHROCYTE [DISTWIDTH] IN BLOOD BY AUTOMATED COUNT: 11.6 % (ref 11.5–14.5)
GLUCOSE SERPL-MCNC: 84 MG/DL (ref 74–99)
HCT VFR BLD AUTO: 41.7 % (ref 36–46)
HGB BLD-MCNC: 14 G/DL (ref 12–16)
INR PPP: 0.9 (ref 0.9–1.1)
MCH RBC QN AUTO: 32.5 PG (ref 26–34)
MCHC RBC AUTO-ENTMCNC: 33.6 G/DL (ref 32–36)
MCV RBC AUTO: 97 FL (ref 80–100)
NRBC BLD-RTO: 0 /100 WBCS (ref 0–0)
PLATELET # BLD AUTO: 295 X10*3/UL (ref 150–450)
POTASSIUM SERPL-SCNC: 4.8 MMOL/L (ref 3.5–5.3)
PROTHROMBIN TIME: 9.5 SECONDS (ref 9.8–12.4)
RBC # BLD AUTO: 4.31 X10*6/UL (ref 4–5.2)
RH FACTOR (ANTIGEN D): NORMAL
SODIUM SERPL-SCNC: 141 MMOL/L (ref 136–145)
WBC # BLD AUTO: 7.2 X10*3/UL (ref 4.4–11.3)

## 2025-06-06 PROCEDURE — 85027 COMPLETE CBC AUTOMATED: CPT | Performed by: ORTHOPAEDIC SURGERY

## 2025-06-06 PROCEDURE — 85610 PROTHROMBIN TIME: CPT | Performed by: ORTHOPAEDIC SURGERY

## 2025-06-06 PROCEDURE — 80048 BASIC METABOLIC PNL TOTAL CA: CPT | Performed by: ORTHOPAEDIC SURGERY

## 2025-06-06 PROCEDURE — 86901 BLOOD TYPING SEROLOGIC RH(D): CPT

## 2025-06-06 PROCEDURE — 87081 CULTURE SCREEN ONLY: CPT

## 2025-06-06 PROCEDURE — 99406 BEHAV CHNG SMOKING 3-10 MIN: CPT | Performed by: FAMILY MEDICINE

## 2025-06-06 PROCEDURE — 99205 OFFICE O/P NEW HI 60 MIN: CPT | Performed by: FAMILY MEDICINE

## 2025-06-06 PROCEDURE — 85730 THROMBOPLASTIN TIME PARTIAL: CPT | Performed by: ORTHOPAEDIC SURGERY

## 2025-06-06 PROCEDURE — 93005 ELECTROCARDIOGRAM TRACING: CPT

## 2025-06-06 RX ORDER — IBUPROFEN 400 MG/1
400 TABLET, FILM COATED ORAL EVERY 6 HOURS PRN
COMMUNITY

## 2025-06-06 RX ORDER — CHLORHEXIDINE GLUCONATE 40 MG/ML
SOLUTION TOPICAL
Qty: 473 ML | Refills: 0 | Status: SHIPPED | OUTPATIENT
Start: 2025-06-06

## 2025-06-06 RX ORDER — CHLORHEXIDINE GLUCONATE ORAL RINSE 1.2 MG/ML
SOLUTION DENTAL
Qty: 15 ML | Refills: 0 | Status: SHIPPED | OUTPATIENT
Start: 2025-06-06

## 2025-06-06 RX ORDER — BACLOFEN 5 MG/1
5 TABLET ORAL 3 TIMES DAILY
COMMUNITY
Start: 2025-03-18

## 2025-06-06 RX ORDER — ACETAMINOPHEN 500 MG
50 TABLET ORAL EVERY OTHER DAY
COMMUNITY

## 2025-06-06 ASSESSMENT — DUKE ACTIVITY SCORE INDEX (DASI)
CAN YOU TAKE CARE OF YOURSELF (EAT, DRESS, BATHE, OR USE TOILET): YES
CAN YOU PARTICIPATE IN STRENOUS SPORTS LIKE SWIMMING, SINGLES TENNIS, FOOTBALL, BASKETBALL, OR SKIING: NO
CAN YOU DO MODERATE WORK AROUND THE HOUSE LIKE VACUUMING, SWEEPING FLOORS OR CARRYING GROCERIES: YES
CAN YOU HAVE SEXUAL RELATIONS: NO
TOTAL_SCORE: 26.95
CAN YOU WALK A BLOCK OR TWO ON LEVEL GROUND: YES
CAN YOU CLIMB A FLIGHT OF STAIRS OR WALK UP A HILL: YES
CAN YOU DO LIGHT WORK AROUND THE HOUSE LIKE DUSTING OR WASHING DISHES: YES
CAN YOU WALK INDOORS, SUCH AS AROUND YOUR HOUSE: YES
CAN YOU DO HEAVY WORK AROUND THE HOUSE LIKE SCRUBBING FLOORS OR LIFTING AND MOVING HEAVY FURNITURE: NO
CAN YOU PARTICIPATE IN MODERATE RECREATIONAL ACTIVITIES LIKE GOLF, BOWLING, DANCING, DOUBLES TENNIS OR THROWING A BASEBALL OR FOOTBALL: NO
CAN YOU RUN A SHORT DISTANCE: YES
DASI METS SCORE: 6.1
CAN YOU DO YARD WORK LIKE RAKING LEAVES, WEEDING OR PUSHING A MOWER: NO

## 2025-06-06 ASSESSMENT — ENCOUNTER SYMPTOMS
NECK PAIN: 1
RESPIRATORY NEGATIVE: 1
ENDOCRINE NEGATIVE: 1
ARTHRALGIAS: 1
EYES NEGATIVE: 1
NECK NEGATIVE: 1
NUMBNESS: 1
CONSTITUTIONAL NEGATIVE: 1
GASTROINTESTINAL NEGATIVE: 1
CARDIOVASCULAR NEGATIVE: 1

## 2025-06-06 ASSESSMENT — PAIN SCALES - GENERAL: PAINLEVEL_OUTOF10: 2

## 2025-06-06 ASSESSMENT — PAIN - FUNCTIONAL ASSESSMENT: PAIN_FUNCTIONAL_ASSESSMENT: 0-10

## 2025-06-06 ASSESSMENT — LIFESTYLE VARIABLES: SMOKING_STATUS: SMOKER

## 2025-06-06 NOTE — CPM/PAT H&P
"CPM/PAT Evaluation       Name: Esther Nicole (Esther Nicole \"Bebe\")  /Age: 1990/34 y.o.       Visit Type:   In-Person       Chief Complaint: perioperative evaluation    HPI    Esther Nicole is a 34 y.o. female scheduled for cervical discectomy anterior approach fusion on 2025 secondary to Cervical radiculopathy, Connective tissue stenosis of neural canal of cervical region, Herniation of cervical intervertebral disc with radiculopathy with Dr. Collado who referred to CPM.  Presents to [facility] today for perioperative risk stratification and optimization. PMHx includes asthma, anxiety, ADHD, Cervical radiculopathy       Medical History[1]    Surgical History[2]    Patient Sexual activity questions deferred to the physician.    Family History[3]    Allergies[4]    Prior to Admission medications    Medication Sig Start Date End Date Taking? Authorizing Provider   baclofen (Lioresal) 5 mg tablet Take 1 tablet (5 mg) by mouth 3 times a day. 3/18/25  Yes Historical Provider, MD   cholecalciferol (Vitamin D3) 50 mcg (2,000 units) capsule Take 1 capsule (50 mcg) by mouth every other day.   Yes Historical Provider, MD   gabapentin (Neurontin) 300 mg capsule Take 1 capsule (300 mg) by mouth 2 times a day. 3/14/25 9/10/25 Yes Viri Mazariegos PA-C   ibuprofen (IBU) 400 mg tablet Take 1 tablet (400 mg) by mouth every 6 hours if needed for moderate pain (4 - 6).   Yes Historical Provider, MD   medical cannabis Take 1 each by mouth once daily as needed (As needed for pain). Gummy and/ or flower   Yes Historical Provider, MD   norgestimate-ethinyl estradioL (Ortho Tri-Cyclen LO) 0.18/0.215/0.25 mg-0.025 mg tablet Take 1 tablet by mouth once daily. 25 Yes Ras Jefferson MD   norgestimate-ethinyl estradioL (Ortho Tri-Cyclen LO) 0.18/0.215/0.25 mg-25 mcg tablet Take 1 tablet by mouth once daily. 25  Ras Jefferson MD        PAT ROS:   Constitutional:   neg    Neuro/Psych:    numbness (right " "arm due to cervical issues)  Eyes:   neg    Ears:   neg    Nose:   neg    Mouth:   neg    Throat:   neg    Neck:   neg     neck pain (usually around 7/10, today 2/10)  Cardio:   neg    Respiratory:   neg    Endocrine:   neg    GI:   neg    :   neg    Musculoskeletal:    arthralgias  Hematologic:   neg    Skin:  neg        PAT Physical Exam     PAT AIRWAY:   Airway:      Cannot move neck very well    Mallampati::  III    TM distance::  >3 FB    Neck ROM::  Limited  normal           Visit Vitals  /87   Pulse 68   Temp 36.6 °C (97.8 °F) (Temporal)   Ht 1.549 m (5' 1\")   Wt 100 kg (220 lb 6.4 oz)   LMP 05/02/2025   SpO2 98%   BMI 41.64 kg/m²   OB Status Having periods   Smoking Status Never   BSA 2.07 m²       DASI Risk Score      Flowsheet Row Pre-Admission Testing from 6/6/2025 in [facility]   Can you take care of yourself (eat, dress, bathe, or use toilet)?  2.75 filed at 06/06/2025 0749   Can you walk indoors, such as around your house? 1.75 filed at 06/06/2025 0749   Can you walk a block or two on level ground?  2.75 filed at 06/06/2025 0749   Can you climb a flight of stairs or walk up a hill? 5.5 filed at 06/06/2025 0749   Can you run a short distance? 8 filed at 06/06/2025 0749   Can you do light work around the house like dusting or washing dishes? 2.7 filed at 06/06/2025 0749   Can you do moderate work around the house like vacuuming, sweeping floors or carrying groceries? 3.5 filed at 06/06/2025 0749   Can you do heavy work around the house like scrubbing floors or lifting and moving heavy furniture?  0 filed at 06/06/2025 0749   Can you do yard work like raking leaves, weeding or pushing a mower? 0 filed at 06/06/2025 0749   Can you have sexual relations? 0 filed at 06/06/2025 0749   Can you participate in moderate recreational activities like golf, bowling, dancing, doubles tennis or throwing a baseball or football? 0 filed at 06/06/2025 0749   Can you participate in strenous sports " like swimming, singles tennis, football, basketball, or skiing? 0 filed at 06/06/2025 0749   DASI SCORE 26.95 filed at 06/06/2025 0749   METS Score (Will be calculated only when all the questions are answered) 6.1 filed at 06/06/2025 0749          Ariadnai DVT Assessment      Flowsheet Row Pre-Admission Testing from 6/6/2025 in [facility]   DVT Score (IF A SCORE IS NOT CALCULATING, MUST SELECT A BMI TO COMPLETE) 6 filed at 06/06/2025 0822   Surgical Factors Major surgery planned, including arthroscopic and laproscopic (1-2 hours) filed at 06/06/2025 0822   Women Oral contraceptives filed at 06/06/2025 0822   BMI (BMI MUST BE CHOSEN) 41-50 (Morbid obesity) filed at 06/06/2025 0822          Modified Frailty Index    No data to display       [id number] Stroke Risk Points  Current as of 6 minutes ago        N/A 0 to 9 Points:      Last Change: N/A          The QIG2CY2-KCMw risk score (Lip GH, et al. 2009. © 2010 [employer]) quantifies the risk of stroke for a patient with atrial fibrillation. For patients without atrial fibrillation or under the age of 18 this score appears as N/A. Higher score values generally indicate higher risk of stroke.        This score is not applicable to this patient. Components are not calculated.          Revised Cardiac Risk Index      Flowsheet Row Pre-Admission Testing from 6/6/2025 in [facility]   High-Risk Surgery (Intraperitoneal, Intrathoracic,Suprainguinal vascular) 0 filed at 06/06/2025 0822   History of ischemic heart disease (History of MI, History of positive exercuse test, Current chest paint considered due to myocardial ischemia, Use of nitrate therapy, ECG with pathological Q Waves) 0 filed at 06/06/2025 0822   History of congestive heart failure (pulmonary edemia, bilateral rales or S3 gallop, Paroxysmal nocturnal dyspnea, CXR showing pulmonary vascular redistribution) 0 filed at 06/06/2025 0822   History of  cerebrovascular disease (Prior TIA or stroke) 0 filed at 06/06/2025 0822   Pre-operative insulin treatment 0 filed at 06/06/2025 0822   Pre-operative creatinine>2 mg/dl 0 filed at 06/06/2025 0822   Revised Cardiac Risk Calculator 0 filed at 06/06/2025 0822          Apfel Simplified Score      Flowsheet Row Pre-Admission Testing from 6/6/2025 in [facility]   Smoking status 0 filed at 06/06/2025 0822   History of motion sickness or PONV  0 filed at 06/06/2025 0822   Use of postoperative opioids 1 filed at 06/06/2025 0822   Gender - Female 1=Yes filed at 06/06/2025 0822   Apfel Simplified Score Calculator 2 filed at 06/06/2025 0822          Risk Analysis Index Results This Encounter    No data found in the last 10 encounters.       Stop Bang Score      Flowsheet Row Pre-Admission Testing from 6/6/2025 in [facility]   Do you snore loudly? 0 filed at 06/06/2025 0749   Do you often feel tired or fatigued after your sleep? 0 filed at 06/06/2025 0749   Has anyone ever observed you stop breathing in your sleep? 0 filed at 06/06/2025 0749   Do you have or are you being treated for high blood pressure? 0 filed at 06/06/2025 0749   Recent BMI (Calculated) 42 filed at 06/06/2025 0749   Is BMI greater than 35 kg/m2? 1=Yes filed at 06/06/2025 0749   Age older than 50 years old? 0=No filed at 06/06/2025 0749   Is your neck circumference greater than 17 inches (Male) or 16 inches (Female)? 0 filed at 06/06/2025 0749   Gender - Male 0=No filed at 06/06/2025 0749   STOP-BANG Total Score 1 filed at 06/06/2025 0749          Prodigy: High Risk  Total Score: 0          ARISCAT Score for Postoperative Pulmonary Complications      Flowsheet Row Pre-Admission Testing from 6/6/2025 in [facility]   Age Calculated Score 0 filed at 06/06/2025 0822   Preoperative SpO2 0 filed at 06/06/2025 0822   Respiratory infection in the last month Either upper or lower (i.e., URI, bronchitis, pneumonia),  "with fever and antibiotic treatment 0 filed at 06/06/2025 0822   Preoperative anemia (Hgb less than 10 g/dl) 0 filed at 06/06/2025 0822   Surgical incision  0 filed at 06/06/2025 0822   Duration of surgery  0 filed at 06/06/2025 0822   Emergency Procedure  0 filed at 06/06/2025 0822   ARISCAT Total Score  0 filed at 06/06/2025 0822          Timo Perioperative Risk for Myocardial Infarction or Cardiac Arrest (ESTEFANIA)      Flowsheet Row Pre-Admission Testing from 6/6/2025 in [facility]   Calculated Age Score 0.64 filed at 06/06/2025 0822   Functional Status  0 filed at 06/06/2025 0822   ASA Class  -3.29 filed at 06/06/2025 0822   Creatinine 0 filed at 06/06/2025 0822   Type of Procedure  0.21 filed at 06/06/2025 0822   ESTEFANIA Total Score  -7.69 filed at 06/06/2025 0822   ESTEFANIA % 0.05 filed at 06/06/2025 0822          Assessment and Plan:    Anesthesia/airway:  No anesthesia complications  Did mention one of the pain meds she received for her foot surgery would make her throw up.  She does not remember the name of the medication.    Neuro:  # Anxiety, Depression, ADHD -follows with PCP, not currently medicated, no further perioperative intervention     Alert and oriented x 3.  No cognitive decline.     The patient is not at increased risk for perioperative delirium      Patient is not at increased risk for perioperative CVA      HEENT  No HEENT diagnosis or significant findings on chart review or clinical presentation and evaluation. No further preoperative testing/intervention indicated at this time.    Patient denies needing corrective lenses.  Denies dentures, missing teeth, broken teeth.  Denies hearing loss.    Cardiovascular  No CV diagnosis or significant findings on chart review or clinical presentation and evaluation. No further preoperative testing or intervention is indicated at this time.    Vital signs today are:  /87   Pulse 68   Temp 36.6 °C (97.8 °F) (Temporal)   Ht 1.549 m (5' 1\") "   Wt 100 kg (220 lb 6.4 oz)   LMP 05/02/2025   SpO2 98%   BMI 41.64 kg/m²     METS: 6.1  RCRI: 0 points, 3.9%  30 day risk of MACE (risk for cardiac death, nonfatal myocardial infarction, and nonfactal cardiac arrest)  ESTEFANIA: 0.05% risk for 30-day postoperative MACE  EKG -June 6, 2025  Normal sinus rhythm with sinus arrhythmia  Possible anterior infarct age undetermined  Abnormal ECG  Pending cardiologist review         Pulmonary  #Asthma -reports is well-controlled and does not use any respiratory treatments.  Last used her albuterol inhaler 1 to 2 months ago. denies any exacerbations or hospitalizations in the past year.  Physical exam is benign, denies respiratory symptoms.  No further perioperative intervention.     Tobacco Use Disorder -smoking cessation information discussed with patient.  Advised single greatest thing patient can do for health is to stop smoking.    Preoperative deep breathing educational handout provided to patient.    Patient is at increased risk of perioperative complications secondary to  Tobacco abuse, obesity, types of anesthetic    Stop Bang 1 point(s) which is a low risk for moderate to severe SARAH     ARISCAT: <26 points, 1.6% risk of in-hospital postoperative pulmonary complication         PRODIGY: 3 points which is a Low risk for opioid-induced respiratory depression          Smoking cessation discussed with patient, patient also provided cessation education/hotline handout, Pumonary toilet education discussed, patient also provided deep breathing exercises and incentive spirometry educational handout    Renal  No Renal diagnosis or significant findings on chart review or clinical presentation and evaluation. No further preoperative testing or intervention is indicated at this time.    Endocrine  No endocrine diagnosis or significant findings on chart review or clinical presentation and evaluation. No further testing or intervention is indicated at this time.    Pt denies  steroids in last 6 months        Hematologic  No hematologic diagnosis or significant findings on chart review or clinical presentation and evaluation. No further testing or intervention is indicated at this time.    Patient confirms that they will accept blood should they need it.     Caprini Score 6, patient at High risk for perioperative DVT.                      Patient provided VTE education/handout.      Gastrointestinal  No GI diagnosis or significant findings on chart review or clinical presentation and evaluation. Denies GI/ symptoms. Please refer to below risk factor scores.      Eat-10 score 0: self-perceived oropharyngeal dysphagia scale (0-40)     Apfel 2 points 39% risk for post operative N/V    Infectious disease  No infectious diagnosis or significant findings on chart review or clinical presentation and evaluation.     Prescription provided for CHG body wash and dental rinse. CHG use instructions reviewed and provided to patient.  Staph screen collected    Patient denies use of antibiotics in the past 3 months.    Musculoskeletal  #Arthritis, Cervical radiculopathy  -presenting today for perioperative evaluation prior to discectomy. Instructed to avoid NSAIDs 7 days prior to surgery.  Expresses understanding.  No further workup needed      Orders  Labs & Imaging ordered and review:  CBC, BMP, Coags, MRSA, T&S, HbA1c, EKG    Laboratory results  Recent Results (from the past 16 weeks)   THINPREP PAP TEST    Collection Time: 02/14/25 11:04 AM   Result Value Ref Range    Case Report       Gynecologic Cytology                              Case: [id number]                                   Authorizing Provider:  Ras Jefferson MD            Collected:           02/14/2025 1104              Ordering Location:     [facility]   Received:            02/14/2025 1104                                     Center                                                                       First Screen:           NATASHA Burleson                                                          Specimen:    ThinPrep Liquid-Based Pap-Imaging System Screen, CERVIX, SCREENING                         Final Cytological Interpretation           A. THINPREP PAP CERVIX, SCREENING -     Specimen Adequacy  Satisfactory for evaluation; endocervical/transformation zone component is present    General Categorization  Negative for intraepithelial lesion or malignancy.    Descriptive Interpretation  Negative for intraepithelial lesion or malignancy                    Slide(s) initially screened by NATASHA Burleson at [address] [id number]  By the signature on this report, the individual or group listed as making the Final Interpretation/Diagnosis certifies that they have reviewed this case.       ThinPrep Imaging System       This specimen has been analyzed by the ThinPrep Imaging System ([employer] an automated imaging and review system, which assists the laboratory in evaluating cells on ThinPrep Pap tests. Following automated imaging, selected fields from every slide were reviewed by a cytotechnologist and/or pathologist.        Educational Note       Cervical cytology is a screening procedure primarily for squamous cancers and precursors and has associated false-negative and false-positives results as evidenced by published data. Your patient's test should be interpreted in this context, together with the patient's history and clinical findings. Regular sampling and follow-up of unexplained clinical signs and symptoms are recommended to minimize false negative results.      Perform HPV HR test? Always (all interpretations)     Include HPV Genotype? Yes     LMP 2/10/2025    HPV DNA High Risk With Genotype    Collection Time: 02/14/25 11:04 AM   Result Value Ref Range    HPV, high-risk Negative Negative    HPV Type 16 DNA Negative Negative    HPV Type 18 DNA Negative Negative    HPV non-Type 16  or 18 DNA Negative Negative   Comprehensive Metabolic Panel    Collection Time: 03/28/25  1:39 PM   Result Value Ref Range    GLUCOSE 84 65 - 99 mg/dL    UREA NITROGEN (BUN) 17 7 - 25 mg/dL    CREATININE 0.68 0.50 - 0.97 mg/dL    EGFR 119 > OR = 60 mL/min/1.73m2    SODIUM 139 135 - 146 mmol/L    POTASSIUM 4.3 3.5 - 5.3 mmol/L    CHLORIDE 102 98 - 110 mmol/L    CARBON DIOXIDE 30 20 - 32 mmol/L    ELECTROLYTE BALANCE 7 7 - 17 mmol/L (calc)    CALCIUM 9.4 8.6 - 10.2 mg/dL    PROTEIN, TOTAL 6.7 6.1 - 8.1 g/dL    ALBUMIN 4.4 3.6 - 5.1 g/dL    BILIRUBIN, TOTAL 0.8 0.2 - 1.2 mg/dL    ALKALINE PHOSPHATASE 60 31 - 125 U/L    AST 12 10 - 30 U/L    ALT 19 6 - 29 U/L   Hemoglobin A1C    Collection Time: 03/28/25  1:39 PM   Result Value Ref Range    HEMOGLOBIN A1c 5.3 <5.7 % of total Hgb    eAG (mg/dL) 105 mg/dL    eAG (mmol/L) 5.8 mmol/L   Lipid Panel    Collection Time: 03/28/25  1:39 PM   Result Value Ref Range    CHOLESTEROL, TOTAL 236 (H) <200 mg/dL    HDL CHOLESTEROL 47 (L) > OR = 50 mg/dL    TRIGLYCERIDES 179 (H) <150 mg/dL    LDL-CHOLESTEROL 158 (H) mg/dL (calc)    CHOL/HDLC RATIO 5.0 (H) <5.0 (calc)    NON HDL CHOLESTEROL 189 (H) <130 mg/dL (calc)   Vitamin D 25-Hydroxy,Total (for eval of Vitamin D levels)    Collection Time: 03/28/25  1:39 PM   Result Value Ref Range    VITAMIN D,25-OH,TOTAL,IA 26 (L) 30 - 100 ng/mL   Urinalysis with Reflex Culture and Microscopic    Collection Time: 03/28/25  1:39 PM   Result Value Ref Range    COLOR YELLOW YELLOW    APPEARANCE CLEAR CLEAR    SPECIFIC GRAVITY 1.023 1.001 - 1.035    PH < OR = 5.0 5.0 - 8.0    GLUCOSE NEGATIVE NEGATIVE    BILIRUBIN NEGATIVE NEGATIVE    KETONES NEGATIVE NEGATIVE    OCCULT BLOOD 1+ (A) NEGATIVE    PROTEIN NEGATIVE NEGATIVE    NITRITE NEGATIVE NEGATIVE    LEUKOCYTE ESTERASE NEGATIVE NEGATIVE    WBC 0-5 < OR = 5 /HPF    RBC NONE SEEN < OR = 2 /HPF    SQUAMOUS EPITHELIAL CELLS 0-5 < OR = 5 /HPF    BACTERIA FEW (A) NONE SEEN /HPF    HYALINE CAST NONE SEEN  NONE SEEN /LPF    NOTE      REFLEXIVE URINE CULTURE        Nares pending    Other  Hold all vitamins and supplements 7 days prior to surgery  Tylenol okay to continue, please hold Aleve/naproxen/ibuprofen/Excedrin/Motrin/Mobic (NSAIDs) for 7 days prior to surgery  No lotion/moisturizers or Deoderant after last shower prior to surgery    Medication, NPO, and all other CPM instructions were reviewed with the patient.  All patient questions were addressed.    Zakia Benz, APRN-CNP           [1]   Past Medical History:  Diagnosis Date    ADHD (attention deficit hyperactivity disorder)     Anxiety     Arthritis     Asthma     albuterol prn not used in about 1.5-2 months    Cervical disc disease     Obesity    [2]   Past Surgical History:  Procedure Laterality Date    BUNIONECTOMY Right     LIPOMA RESECTION Right     foot right   [3]   Family History  Problem Relation Name Age of Onset    Other (neck issue) Mother      Other (neck issues) Maternal Grandmother     [4] No Known Allergies

## 2025-06-06 NOTE — NURSING NOTE
Patient seen in PAT. Orders reviewed with PAT Provider.     Following labs obtained by documenting RN:   COAG, MRSA SWAB, T/S, CBC, BMP    EKG: COMPLETED TODAY    Patient discharged with: Pre-procedure instructions/AVS, Incentive spirometer, and ERAS Kit.        Florecita ENCINASN, RN  [facility]   [facility]

## 2025-06-06 NOTE — PREPROCEDURE INSTRUCTIONS
Thank you for visiting [facility] ([facility]) today for your pre-procedure evaluation, you were seen by     ORIN Saba  Department of Anesthesiology and Perioperative Medicine  Main phone [phone]  Fax [phone]    This summary includes instructions and information to aid you during your perioperative period.  Please read carefully. If you have any questions about your visit today, please call the number listed above.  If you become ill or have any changes to your health before your surgery, please contact your primary care provider and alert your surgeon.    General Medications Instructions (see back for further medication instructions)  Hold all vitamins and supplements 7 days prior to surgery  Tylenol okay to continue, please HOLD Aleve/naproxen/ibuprofen (NSAIDs) for 7 days prior to surgery  No lotion/moisturizers or Deoderant after last shower prior to surgery    You will be called business day prior to surgery to confirm arrival time.     Preparing for Surgery       Preoperative Fasting Guidelines  Why must I stop eating and drinking near surgery time?  With sedation, food or liquid in your stomach can enter your lungs causing serious complications  Food can increase nausea and vomiting  When do I need to stop eating and drinking before my surgery?  Do not eat any food after midnight the night before your surgery/procedure.  You may have up to 13.5 ounces of clear liquid until TWO hours before your instructed arrival time to the hospital.  This includes water, black tea/coffee, (no milk or cream) apple juice, and electrolyte drinks (Gatorade)  You may chew gum until TWO hours before your surgery/procedure   Do not eat any food after midnight the night before your surgery/procedure. You may have up to 13.5 ounces of clear liquid until TWO hours before your instructed arrival time to the hospital.  This includes water, black tea/coffee, (no milk or cream)  apple juice, and electrolyte drinks (Gatorade). You may chew gum until TWO hours before your surgery/procedure       Tobacco and Alcohol;  Do not drink alcohol or smoke within 24 hours of surgery.  It is best to quit smoking for as long as possible before any surgery or procedure.    Quitting Smoking; Recognizing Dangerous Situations:  1-Alcohol use during the first month after quitting, 2-Being around smoke or someone who smokes 3-Times situation routinely smoked  4-Triggers-car, breaks, coffee, when awakening, social events  Coping Skills: 1-Learning new ways to manage stress 2-Exercising 3-Relaxation breathing   4-Change routines 5-Distraction techniques    Websites:  Smoke-Free - offers free text messages and an alyssa to help you quit. Info includes eating and mood issues that may come with quitting. There is a Live Helpline to talk to an expert. Go to smokefree.gov; Become an Ex-Smoker - the free EX Plan is based on scientific research and useful advice from ex-smokers. It isn't just about quitting smoking.  It's about re-learning life without cigarettes using a 3-step program.  Go to becomeanex.org   Centers for Disease Control offer many suggestions for helping you quit. Includes a Quit Guide and real-life stories. There are sections for specific groups such as LGBT, , different ethnic groups, and pregnant women.  Go to cdc.gov/tobacco/campaign/tips    Other Resources:  [place] Tobacco Quit Line - call 1-800-QUIT-NOW or 1-583.144.8197.  [facility] 2-1-1 - to find local programs and resources. Call 211 or go to 211.org.  [facility] Tobacco Cessation Program - call 619-077-2028.  American Lung Association offers classes for quitting smoking. Some places may charge a fee. For a list of classes, go to lung.org or call 1-893-LUNG-[place].     Some things to think about:; The health benefits of quitting smoking can help most of the major parts of your body. There is no safe amount of cigarette smoke.  Quitting smoking can add years to your life. When you quit, you'll also protect your loved ones from dangerous secondhand smoke. Make a plan, join a support group, and talk to your physician to assist in quitting smoking.                                                                                                                  Home Preoperative Antibacterial Shower     What is a home preoperative antibacterial shower?  This shower is a way of cleaning the skin with a germ killing soap before surgery.  The soap contains chlorhexidine, commonly known as CHG.  CHG is a soap for your skin with germ killing ability.  Let your doctor know if you are allergic to chlorhexidine.    Why do I need to take a preoperative antibacterial shower?  Skin is not sterile.  It is best to try to make your skin as free of germs as possible before surgery.  Proper cleansing with a germ killing soap before surgery can lower the number of germs on your skin.  This helps to reduce the risk of infection at the surgical site.  Following the instructions listed below will help you prepare your skin for surgery.      How do I use the CHG skin cleanser?  Steps:  Begin using your CHG soap five days before your scheduled surgery on ________________________.    Days 1-4 Shower before bed:  Wash your face and genitals with your normal soap and rinse.           2.    Apply the CHG soap to a clean wet washcloth.  Turn the water off or move away                From the water spray to avoid premature rinsing of the CHG soap as you are applying.     3.   Lather your entire body from the neck down.  Do not use on your face or genitals.  4. Pay special attention to the area(s) where your incision(s) will be located unless they are on your face.  Avoid scrubbing your skin too hard.  The important point is to have the CHG soap sit on your skin for 3 minutes.    When the 3 minutes are up, turn on the water and rinse the CHG soap off your body completely.    Pat yourself dry with a clean, freshly-laundered towel.  Dress in clean, freshly laundered night clothes.    Be sure to change bed sheets and blankets at least on the first night of CHG body wash use. May change linens every night of the above protocol for maximum benefit.   Day 5:  Last shower is the morning of surgery: Follow above Instructions.    NOTE:        *Keep CHG soap out of eyes and ear canals   *DO NOT wash with regular soap on your body after you have used the CHG soap solution  *DO NOT apply powders, lotions, or perfume.  *Deodorant may be used days 1-4, BUT NOT the day of surgery          Patient Information: Pre-Operative Infection Prevention Measures     Why did I have my nose, under my arms and groin swabbed?  The purpose of the swab is to identify Staphylococcus aureus inside your nose or on your skin.  The swab was sent to the laboratory for culture.  A positive swab/culture for Staphylococcus aureus is called colonization or carriage.      What is Staphylococcus aureus?  Staphylococcus aureus, also known as “staph”, is a germ found on the skin or in the nose of healthy people.  Sometimes Staphylococcus aureus can get into the body and cause an infection.  This can be minor (such as pimples, boils or other skin problems).  It might also be serious (such as blood infection, pneumonia or a surgical site infection).    What is Staphylococcus aureus colonization or carriage?  Colonization or carriage means that a person has the germ but is not sick from it.  These bacteria can be spread on the hands or when breathing or sneezing.    How is Staphylococcus aureus spread?  It is most often spread by close contact with a person or item that carries it.    What happens if my culture is positive for Staphylococcus aureus?  Your doctor/medical team will use this information to guide any antibiotic treatment which may be necessary.  Regardless of the culture results, we will clean the inside of your nose  with a betadine swab just before you have your surgery.      Will I get an infection if I have Staphylococcus aureus in my nose or on my skin?  Anyone can get an infection with Staphylococcus aureus.  However, the best way to reduce your risk of infection is to follow the instructions provided to you for the use of your CHG soap and dental rinse.            Patient Information: Oral/Dental Rinse  **This is a prescription; pick it up at your preferred local pharmacy **  What is oral/dental rinse?   It is a mouthwash. It is a way of cleaning the mouth with a germ killing solution before your surgery.  The solution contains chlorhexidine, commonly known as CHG.   It is used inside the mouth to kill a bacteria known as Staphylococcus aureus.  Let your doctor know if you are allergic to Chlorhexidine.    Why do I need to use CHG oral/dental rinse?  The CHG oral/dental rinse helps to kill a bacteria in your mouth known a Staphylococcus aureus.     This reduces the risk of infection at the surgical site.      Using your CHG oral/dental rinse  STEPS:  Use your CHG oral/dental rinse after you brush your teeth the night before (at bedtime) and the morning of your surgery.  Follow all directions on your prescription label.    Use the cap on the container to measure 15ml (fill cap to fill line)  Swish (gargle if you can) the mouthwash in your mouth for at least 30 seconds, (do not to swallow) spit out  After you use your CHG rinse, do not rinse your mouth with water, drink or eat.  Please refer to prescription label for the appropriate time to resume oral intake  Dental rinse comes in one size bottle: 473ml ~16oz.  You will have leftover    rinse, discard after this use.    What side effects might I have using the CHG oral/dental rinse?  CHG rinse will stick to plaque on the teeth.  Brush and floss just before use.  Teeth brushing will help avoid staining of plaque during use.           Ensure Surgery Immuno-Nutrition Shake;  This shake provides specialized nutrients to help prepare your body for surgery. Your surgeon's office may send it to your home, or you may receive it from [facility] if you are scheduled for an appointment.  If your surgeon has instructed you to begin the shakes and you have not received them at least 7 days before your scheduled surgery, please contact your surgeon's office. You should begin drinking your shakes 6 days before your surgery date, start on _______.  You should drink 1 carton three times a day, you can have one carton with breakfast, lunch, and dinner.  If your surgeon has given you instructions to drink clear liquids the day before surgery, you can continue to drink your Ensure Surgery immune-nutrition shakes.     The Week before Surgery        Seven days before Surgery  Check your [facility] medication instructions  Do the exercises provided to you by [facility]   Arrange for a responsible, adult licensed  to take you home after surgery and stay with you for 24 hours.  You will not be permitted to drive yourself home if you have received any anesthetic/sedation  Five days before surgery  Check your [facility] medication instructions  Do the exercises provided to you by [facility]   Start using Chlorhexidene (CHG) body wash if prescribed (Continue till day of surgery)      The Day before Surgery       Check your [facility] medication and all other [facility] instructions including when to stop eating and drinking  You will be called with your arrival time for surgery in the late afternoon.  If you do not receive a call please reach out to your surgeon's office.  Do not smoke or drink 24 hours before surgery  Prepare items to bring with you to the hospital  Shower with your chlorhexidine wash if prescribed  Brush your teeth and use your chlorhexidine dental rinse if prescribed    The Day of Surgery       Check your [facility] medication instructions  Ensure you follow the instructions for when to stop eating and  drinking  Shower, if prescribed use CHG.  Do not apply any lotions, creams, moisturizers, perfume or deodorant  Brush your teeth and use your CHG dental rinse if prescribed  Wear loose comfortable clothing  Avoid make-up  Remove  jewelry and piercings, consider professional piercing removal with a plastic spacer if needed  Bring photo ID and Insurance card  Bring an accurate medication list that includes medication dose, frequency and allergies  Bring a copy of your advanced directives (will, health care power of )  Bring any devices and controllers as well as medical devices you have been provided with for surgery (CPAP, slings, braces, etc.)  Dentures, eyeglasses, and contacts will be removed before surgery, please bring cases for contacts or glasses    Preoperative Deep Breathing Exercises    Why it is important to do deep breathing exercises before my surgery?  Deep breathing exercises strengthen your breathing muscles.  This helps you to recover after your surgery and decreases the chance of breathing complications.    How are the deep breathing exercises done?  Sit straight with your back supported.  Breathe in deeply and slowly through your nose. Your lower rib cage should expand and your abdomen may move forward.  Hold that breath for 3 to 5 seconds.  Breathe out through pursed lips, slowly and completely.  Rest and repeat 10 times every hour while awake.  Rest longer if you become dizzy or lightheaded.         Preoperative Brain Exercises    What are brain exercises?  A brain exercise is any activity that engages your thinking (cognitive) skills.    What types of activities are considered brain exercises?  Jigsaw puzzles, crossword puzzles, word jumble, memory games, word search, and many more.  Many can be found free online or on your phone via a mobile alyssa.    Why should I do brain exercises before my surgery?  More recent research has shown brain exercise before surgery can lower the risk of  postoperative delirium (confusion) which can be especially important for older adults.  Patients who did brain exercises for 5 to 10 hours the days before surgery, cut their risk of postoperative delirium in half up to 1 week after surgery.    Sit-to-Stand Exercise    What is the sit-to-stand exercise?  The sit-to-stand exercise strengthens the muscles of your lower body and muscles in the center of your body (core muscles for stability) helping to maintain and improve your strength and mobility.  How do I do the sit-to-stand exercise?  The goal is to do this exercise without using your arms or hands.  If this is too difficult, use your arms and hands or a chair with armrests to help slowly push yourself to the standing position and lower yourself back to the sitting position. As the movement becomes easier use your arms and hands less.    Steps to the sit-to-stand exercise  Sit up tall in a sturdy chair, knees bent, feet flat on the floor shoulder-width apart.  Shift your hips/pelvis forward in the chair to correctly position yourself for the next movement.  Lean forward at your hips.  Stand up straight putting equal weight on both feet.  Check to be sure you are properly aligned with the chair, in a slow controlled movement sit back down.  Repeat this exercise 10-15 times.  If needed you can do it fewer times until your strength improves.  Rest for 1 minute.  Do another 10-15 sit-to-stand exercises.  Try to do this in the morning and evening.       Simple things you can do to help prevent blood clots     Blood clots are blockages that can form in the body's veins. When a blood clot forms in your deep veins, it may be called a deep vein thrombosis, or DVT for short. Blood clots can happen in any part of the body where blood flows, but they are most common in the arms and legs. If a piece of a blood clot breaks free and travels to the lungs, it is called a pulmonary embolus (PE). A PE can be a very serious problem.       Being in the hospital or having surgery can raise your chances of getting a blood clot because you may not be well enough to move around as much as you normally do.         Ways you can help prevent blood clots in the hospital       Wearing SCDs  SCDs stands for Sequential Compression Devices.   SCDs are special sleeves that wrap around your legs. They attach to a pump that fills them with air to gently squeeze your legs every few minutes.  This helps return the blood in your legs to your heart.   SCDs should only be taken off when walking or bathing. SCDs may not be comfortable, but they can help save your life.              Pump SCD leg sleeves  Wearing compression stockings - if your doctor orders them. These special snug-fitting stockings gently squeeze your legs to help blood flow.       Walking. Walking helps move the blood in your legs.   If your doctor says it is ok, try walking the halls at least   5 times a day. Ask us to help you get up, so you don't fall.      Taking any blood-thinning medicines your doctor orders.              Ways you can help prevent blood clots at home         Wearing compression stockings - if your doctor orders them.   Walking - to help move the blood in your legs.    Taking any blood-thinning medicines your doctor orders.      Signs of a blood clot or PE    Tell your doctor or nurse right away if you have any of the problems listed below.         If you are at home, seek medical care right away. Call 911 for chest pain or problems breathing.            Signs of a blood clot (DVT) - such as pain, swelling, redness, or warmth in your arm or legs.  Signs of a pulmonary embolism (PE) - such as chest pain or feeling short of breath

## 2025-06-08 LAB — STAPHYLOCOCCUS SPEC CULT: NORMAL

## 2025-06-12 ENCOUNTER — TELEPHONE (OUTPATIENT)
Dept: INPATIENT UNIT | Facility: HOSPITAL | Age: 35
End: 2025-06-12
Payer: COMMERCIAL

## 2025-06-13 ENCOUNTER — HOSPITAL ENCOUNTER (OUTPATIENT)
Dept: NEUROLOGY | Facility: HOSPITAL | Age: 35
Setting detail: OUTPATIENT SURGERY
Discharge: HOME | End: 2025-06-13
Payer: COMMERCIAL

## 2025-06-13 ENCOUNTER — ANESTHESIA (OUTPATIENT)
Dept: OPERATING ROOM | Facility: HOSPITAL | Age: 35
End: 2025-06-13
Payer: COMMERCIAL

## 2025-06-13 ENCOUNTER — APPOINTMENT (OUTPATIENT)
Dept: RADIOLOGY | Facility: HOSPITAL | Age: 35
End: 2025-06-13
Payer: COMMERCIAL

## 2025-06-13 ENCOUNTER — HOSPITAL ENCOUNTER (INPATIENT)
Facility: HOSPITAL | Age: 35
LOS: 1 days | Discharge: HOME HEALTH | End: 2025-06-14
Attending: ORTHOPAEDIC SURGERY | Admitting: ORTHOPAEDIC SURGERY
Payer: COMMERCIAL

## 2025-06-13 DIAGNOSIS — M54.12 CERVICAL RADICULOPATHY: ICD-10-CM

## 2025-06-13 DIAGNOSIS — M99.41 CONNECTIVE TISSUE STENOSIS OF NEURAL CANAL OF CERVICAL REGION: Primary | ICD-10-CM

## 2025-06-13 DIAGNOSIS — M50.10 HERNIATION OF CERVICAL INTERVERTEBRAL DISC WITH RADICULOPATHY: ICD-10-CM

## 2025-06-13 LAB
ABO GROUP (TYPE) IN BLOOD: NORMAL
PREGNANCY TEST URINE, POC: NEGATIVE
RH FACTOR (ANTIGEN D): NORMAL

## 2025-06-13 PROCEDURE — 7100000002 HC RECOVERY ROOM TIME - EACH INCREMENTAL 1 MINUTE: Performed by: ORTHOPAEDIC SURGERY

## 2025-06-13 PROCEDURE — 96376 TX/PRO/DX INJ SAME DRUG ADON: CPT | Mod: 59

## 2025-06-13 PROCEDURE — 2500000005 HC RX 250 GENERAL PHARMACY W/O HCPCS: Performed by: ORTHOPAEDIC SURGERY

## 2025-06-13 PROCEDURE — 96375 TX/PRO/DX INJ NEW DRUG ADDON: CPT | Mod: 59

## 2025-06-13 PROCEDURE — 3600000017 HC OR TIME - EACH INCREMENTAL 1 MINUTE - PROCEDURE LEVEL SIX: Performed by: ORTHOPAEDIC SURGERY

## 2025-06-13 PROCEDURE — 2500000001 HC RX 250 WO HCPCS SELF ADMINISTERED DRUGS (ALT 637 FOR MEDICARE OP): Performed by: STUDENT IN AN ORGANIZED HEALTH CARE EDUCATION/TRAINING PROGRAM

## 2025-06-13 PROCEDURE — G0378 HOSPITAL OBSERVATION PER HR: HCPCS

## 2025-06-13 PROCEDURE — 97161 PT EVAL LOW COMPLEX 20 MIN: CPT | Mod: GP

## 2025-06-13 PROCEDURE — C1762 CONN TISS, HUMAN(INC FASCIA): HCPCS | Performed by: ORTHOPAEDIC SURGERY

## 2025-06-13 PROCEDURE — 22551 ARTHRD ANT NTRBDY CERVICAL: CPT | Performed by: ORTHOPAEDIC SURGERY

## 2025-06-13 PROCEDURE — 22552 ARTHRD ANT NTRBD CERVICAL EA: CPT | Performed by: ORTHOPAEDIC SURGERY

## 2025-06-13 PROCEDURE — 1100000001 HC PRIVATE ROOM DAILY

## 2025-06-13 PROCEDURE — 2500000005 HC RX 250 GENERAL PHARMACY W/O HCPCS

## 2025-06-13 PROCEDURE — 96365 THER/PROPH/DIAG IV INF INIT: CPT | Mod: 59

## 2025-06-13 PROCEDURE — 2500000004 HC RX 250 GENERAL PHARMACY W/ HCPCS (ALT 636 FOR OP/ED)

## 2025-06-13 PROCEDURE — 20931 SP BONE ALGRFT STRUCT ADD-ON: CPT | Performed by: ORTHOPAEDIC SURGERY

## 2025-06-13 PROCEDURE — 3700000001 HC GENERAL ANESTHESIA TIME - INITIAL BASE CHARGE: Performed by: ORTHOPAEDIC SURGERY

## 2025-06-13 PROCEDURE — 2500000001 HC RX 250 WO HCPCS SELF ADMINISTERED DRUGS (ALT 637 FOR MEDICARE OP)

## 2025-06-13 PROCEDURE — 81025 URINE PREGNANCY TEST: CPT | Performed by: ORTHOPAEDIC SURGERY

## 2025-06-13 PROCEDURE — C1713 ANCHOR/SCREW BN/BN,TIS/BN: HCPCS | Performed by: ORTHOPAEDIC SURGERY

## 2025-06-13 PROCEDURE — 3700000002 HC GENERAL ANESTHESIA TIME - EACH INCREMENTAL 1 MINUTE: Performed by: ORTHOPAEDIC SURGERY

## 2025-06-13 PROCEDURE — 95955 EEG DURING SURGERY: CPT

## 2025-06-13 PROCEDURE — 2500000004 HC RX 250 GENERAL PHARMACY W/ HCPCS (ALT 636 FOR OP/ED): Performed by: STUDENT IN AN ORGANIZED HEALTH CARE EDUCATION/TRAINING PROGRAM

## 2025-06-13 PROCEDURE — 22845 INSERT SPINE FIXATION DEVICE: CPT | Performed by: ORTHOPAEDIC SURGERY

## 2025-06-13 PROCEDURE — 36415 COLL VENOUS BLD VENIPUNCTURE: CPT | Performed by: ANESTHESIOLOGY

## 2025-06-13 PROCEDURE — 96374 THER/PROPH/DIAG INJ IV PUSH: CPT | Mod: 59

## 2025-06-13 PROCEDURE — 2780000003 HC OR 278 NO HCPCS: Performed by: ORTHOPAEDIC SURGERY

## 2025-06-13 PROCEDURE — 7100000001 HC RECOVERY ROOM TIME - INITIAL BASE CHARGE: Performed by: ORTHOPAEDIC SURGERY

## 2025-06-13 PROCEDURE — 2720000007 HC OR 272 NO HCPCS: Performed by: ORTHOPAEDIC SURGERY

## 2025-06-13 PROCEDURE — 3600000018 HC OR TIME - INITIAL BASE CHARGE - PROCEDURE LEVEL SIX: Performed by: ORTHOPAEDIC SURGERY

## 2025-06-13 PROCEDURE — 2500000002 HC RX 250 W HCPCS SELF ADMINISTERED DRUGS (ALT 637 FOR MEDICARE OP, ALT 636 FOR OP/ED)

## 2025-06-13 DEVICE — SCREW, ZEVO, VAR SD, 3.5 X 15MM: Type: IMPLANTABLE DEVICE | Site: NECK | Status: FUNCTIONAL

## 2025-06-13 DEVICE — BONE, BLOCK CORTICAL 7 X 14 X 11: Type: IMPLANTABLE DEVICE | Site: NECK | Status: FUNCTIONAL

## 2025-06-13 DEVICE — PIN, DISTRACTION, CERVICAL, 16 MM, STAINLESS STEEL, DISPOSABLE, STERILE: Type: IMPLANTABLE DEVICE | Site: NECK | Status: FUNCTIONAL

## 2025-06-13 DEVICE — DBX PUTTY, 0.5CC
Type: IMPLANTABLE DEVICE | Site: NECK | Status: FUNCTIONAL
Brand: DBX®

## 2025-06-13 DEVICE — BONE, BLOCK CORTICAL 6 X 14 X 11: Type: IMPLANTABLE DEVICE | Site: NECK | Status: FUNCTIONAL

## 2025-06-13 DEVICE — PLATE, ZEVO, 37MM, 2 LEVEL: Type: IMPLANTABLE DEVICE | Site: NECK | Status: FUNCTIONAL

## 2025-06-13 DEVICE — IMPLANTABLE DEVICE: Type: IMPLANTABLE DEVICE | Site: NECK | Status: FUNCTIONAL

## 2025-06-13 RX ORDER — PROPOFOL 10 MG/ML
INJECTION, EMULSION INTRAVENOUS CONTINUOUS PRN
Status: DISCONTINUED | OUTPATIENT
Start: 2025-06-13 | End: 2025-06-13

## 2025-06-13 RX ORDER — NORGESTIMATE AND ETHINYL ESTRADIOL 7DAYSX3 LO
1 KIT ORAL DAILY
Status: DISCONTINUED | OUTPATIENT
Start: 2025-06-13 | End: 2025-06-14 | Stop reason: HOSPADM

## 2025-06-13 RX ORDER — ESMOLOL HYDROCHLORIDE 10 MG/ML
INJECTION INTRAVENOUS AS NEEDED
Status: DISCONTINUED | OUTPATIENT
Start: 2025-06-13 | End: 2025-06-13

## 2025-06-13 RX ORDER — DEXTROSE 50 % IN WATER (D50W) INTRAVENOUS SYRINGE
25
Status: DISCONTINUED | OUTPATIENT
Start: 2025-06-13 | End: 2025-06-14 | Stop reason: HOSPADM

## 2025-06-13 RX ORDER — MIDAZOLAM HYDROCHLORIDE 1 MG/ML
INJECTION INTRAMUSCULAR; INTRAVENOUS AS NEEDED
Status: DISCONTINUED | OUTPATIENT
Start: 2025-06-13 | End: 2025-06-13

## 2025-06-13 RX ORDER — ONDANSETRON HYDROCHLORIDE 2 MG/ML
INJECTION, SOLUTION INTRAVENOUS AS NEEDED
Status: DISCONTINUED | OUTPATIENT
Start: 2025-06-13 | End: 2025-06-13

## 2025-06-13 RX ORDER — BISACODYL 10 MG/1
10 SUPPOSITORY RECTAL DAILY PRN
Status: DISCONTINUED | OUTPATIENT
Start: 2025-06-13 | End: 2025-06-14 | Stop reason: HOSPADM

## 2025-06-13 RX ORDER — APREPITANT 40 MG/1
CAPSULE ORAL AS NEEDED
Status: DISCONTINUED | OUTPATIENT
Start: 2025-06-13 | End: 2025-06-13

## 2025-06-13 RX ORDER — CEFAZOLIN 1 G/1
INJECTION, POWDER, FOR SOLUTION INTRAVENOUS AS NEEDED
Status: DISCONTINUED | OUTPATIENT
Start: 2025-06-13 | End: 2025-06-13

## 2025-06-13 RX ORDER — HYDROMORPHONE HYDROCHLORIDE 1 MG/ML
INJECTION, SOLUTION INTRAMUSCULAR; INTRAVENOUS; SUBCUTANEOUS AS NEEDED
Status: DISCONTINUED | OUTPATIENT
Start: 2025-06-13 | End: 2025-06-13

## 2025-06-13 RX ORDER — HYDROMORPHONE HYDROCHLORIDE 0.2 MG/ML
0.2 INJECTION INTRAMUSCULAR; INTRAVENOUS; SUBCUTANEOUS EVERY 5 MIN PRN
Status: DISCONTINUED | OUTPATIENT
Start: 2025-06-13 | End: 2025-06-13 | Stop reason: HOSPADM

## 2025-06-13 RX ORDER — OXYCODONE HYDROCHLORIDE 5 MG/1
10 TABLET ORAL EVERY 4 HOURS PRN
Status: DISCONTINUED | OUTPATIENT
Start: 2025-06-13 | End: 2025-06-14 | Stop reason: HOSPADM

## 2025-06-13 RX ORDER — OXYCODONE HYDROCHLORIDE 5 MG/1
5 TABLET ORAL EVERY 4 HOURS PRN
Status: DISCONTINUED | OUTPATIENT
Start: 2025-06-13 | End: 2025-06-13 | Stop reason: HOSPADM

## 2025-06-13 RX ORDER — NALOXONE HYDROCHLORIDE 0.4 MG/ML
0.2 INJECTION, SOLUTION INTRAMUSCULAR; INTRAVENOUS; SUBCUTANEOUS EVERY 5 MIN PRN
Status: DISCONTINUED | OUTPATIENT
Start: 2025-06-13 | End: 2025-06-14 | Stop reason: HOSPADM

## 2025-06-13 RX ORDER — AMOXICILLIN 250 MG
2 CAPSULE ORAL 2 TIMES DAILY
Status: DISCONTINUED | OUTPATIENT
Start: 2025-06-13 | End: 2025-06-14 | Stop reason: HOSPADM

## 2025-06-13 RX ORDER — LIDOCAINE HYDROCHLORIDE 20 MG/ML
INJECTION, SOLUTION INFILTRATION; PERINEURAL AS NEEDED
Status: DISCONTINUED | OUTPATIENT
Start: 2025-06-13 | End: 2025-06-13

## 2025-06-13 RX ORDER — SCOPOLAMINE 1 MG/3D
1 PATCH, EXTENDED RELEASE TRANSDERMAL
Status: DISCONTINUED | OUTPATIENT
Start: 2025-06-13 | End: 2025-06-14 | Stop reason: HOSPADM

## 2025-06-13 RX ORDER — CEFAZOLIN SODIUM 2 G/100ML
2 INJECTION, SOLUTION INTRAVENOUS ONCE
Status: DISCONTINUED | OUTPATIENT
Start: 2025-06-13 | End: 2025-06-13 | Stop reason: HOSPADM

## 2025-06-13 RX ORDER — ACETAMINOPHEN 325 MG/1
975 TABLET ORAL EVERY 8 HOURS
Status: DISCONTINUED | OUTPATIENT
Start: 2025-06-13 | End: 2025-06-14 | Stop reason: HOSPADM

## 2025-06-13 RX ORDER — METHOCARBAMOL 100 MG/ML
1000 INJECTION, SOLUTION INTRAMUSCULAR; INTRAVENOUS ONCE
Status: COMPLETED | OUTPATIENT
Start: 2025-06-13 | End: 2025-06-13

## 2025-06-13 RX ORDER — OXYCODONE HYDROCHLORIDE 5 MG/1
10 TABLET ORAL EVERY 4 HOURS PRN
Status: DISCONTINUED | OUTPATIENT
Start: 2025-06-13 | End: 2025-06-13 | Stop reason: HOSPADM

## 2025-06-13 RX ORDER — PROCHLORPERAZINE MALEATE 10 MG
10 TABLET ORAL EVERY 6 HOURS PRN
Status: DISCONTINUED | OUTPATIENT
Start: 2025-06-13 | End: 2025-06-14 | Stop reason: HOSPADM

## 2025-06-13 RX ORDER — CEFAZOLIN SODIUM 2 G/100ML
2 INJECTION, SOLUTION INTRAVENOUS EVERY 8 HOURS
Status: COMPLETED | OUTPATIENT
Start: 2025-06-13 | End: 2025-06-13

## 2025-06-13 RX ORDER — PROCHLORPERAZINE EDISYLATE 5 MG/ML
10 INJECTION INTRAMUSCULAR; INTRAVENOUS EVERY 6 HOURS PRN
Status: DISCONTINUED | OUTPATIENT
Start: 2025-06-13 | End: 2025-06-14 | Stop reason: HOSPADM

## 2025-06-13 RX ORDER — LABETALOL HYDROCHLORIDE 5 MG/ML
5 INJECTION, SOLUTION INTRAVENOUS ONCE AS NEEDED
Status: DISCONTINUED | OUTPATIENT
Start: 2025-06-13 | End: 2025-06-13 | Stop reason: HOSPADM

## 2025-06-13 RX ORDER — BISACODYL 5 MG
10 TABLET, DELAYED RELEASE (ENTERIC COATED) ORAL DAILY PRN
Status: DISCONTINUED | OUTPATIENT
Start: 2025-06-13 | End: 2025-06-14 | Stop reason: HOSPADM

## 2025-06-13 RX ORDER — SODIUM CHLORIDE 0.9 G/100ML
INJECTION, SOLUTION IRRIGATION AS NEEDED
Status: DISCONTINUED | OUTPATIENT
Start: 2025-06-13 | End: 2025-06-13 | Stop reason: HOSPADM

## 2025-06-13 RX ORDER — CHOLECALCIFEROL (VITAMIN D3) 25 MCG
50 TABLET ORAL EVERY OTHER DAY
Status: DISCONTINUED | OUTPATIENT
Start: 2025-06-14 | End: 2025-06-14 | Stop reason: HOSPADM

## 2025-06-13 RX ORDER — GABAPENTIN 300 MG/1
CAPSULE ORAL AS NEEDED
Status: DISCONTINUED | OUTPATIENT
Start: 2025-06-13 | End: 2025-06-13

## 2025-06-13 RX ORDER — ACETAMINOPHEN 325 MG/1
TABLET ORAL AS NEEDED
Status: DISCONTINUED | OUTPATIENT
Start: 2025-06-13 | End: 2025-06-13

## 2025-06-13 RX ORDER — OXYCODONE HYDROCHLORIDE 5 MG/1
5 TABLET ORAL EVERY 4 HOURS PRN
Status: DISCONTINUED | OUTPATIENT
Start: 2025-06-13 | End: 2025-06-14 | Stop reason: HOSPADM

## 2025-06-13 RX ORDER — PROCHLORPERAZINE 25 MG/1
25 SUPPOSITORY RECTAL EVERY 12 HOURS PRN
Status: DISCONTINUED | OUTPATIENT
Start: 2025-06-13 | End: 2025-06-14 | Stop reason: HOSPADM

## 2025-06-13 RX ORDER — SODIUM CHLORIDE, SODIUM LACTATE, POTASSIUM CHLORIDE, CALCIUM CHLORIDE 600; 310; 30; 20 MG/100ML; MG/100ML; MG/100ML; MG/100ML
INJECTION, SOLUTION INTRAVENOUS CONTINUOUS PRN
Status: DISCONTINUED | OUTPATIENT
Start: 2025-06-13 | End: 2025-06-13

## 2025-06-13 RX ORDER — DEXAMETHASONE SODIUM PHOSPHATE 10 MG/ML
10 INJECTION INTRAMUSCULAR; INTRAVENOUS EVERY 8 HOURS SCHEDULED
Status: COMPLETED | OUTPATIENT
Start: 2025-06-13 | End: 2025-06-14

## 2025-06-13 RX ORDER — LIDOCAINE HYDROCHLORIDE 10 MG/ML
0.1 INJECTION, SOLUTION INFILTRATION; PERINEURAL ONCE
Status: DISCONTINUED | OUTPATIENT
Start: 2025-06-13 | End: 2025-06-13 | Stop reason: HOSPADM

## 2025-06-13 RX ORDER — ONDANSETRON HYDROCHLORIDE 2 MG/ML
4 INJECTION, SOLUTION INTRAVENOUS ONCE AS NEEDED
Status: COMPLETED | OUTPATIENT
Start: 2025-06-13 | End: 2025-06-13

## 2025-06-13 RX ORDER — DEXTROSE 50 % IN WATER (D50W) INTRAVENOUS SYRINGE
12.5
Status: DISCONTINUED | OUTPATIENT
Start: 2025-06-13 | End: 2025-06-14 | Stop reason: HOSPADM

## 2025-06-13 RX ORDER — ALBUTEROL SULFATE 0.83 MG/ML
2.5 SOLUTION RESPIRATORY (INHALATION) ONCE AS NEEDED
Status: DISCONTINUED | OUTPATIENT
Start: 2025-06-13 | End: 2025-06-13 | Stop reason: HOSPADM

## 2025-06-13 RX ORDER — METHOCARBAMOL 500 MG/1
1000 TABLET, FILM COATED ORAL 3 TIMES DAILY
Status: DISCONTINUED | OUTPATIENT
Start: 2025-06-13 | End: 2025-06-14 | Stop reason: HOSPADM

## 2025-06-13 RX ORDER — TRANEXAMIC ACID 10 MG/ML
INJECTION, SOLUTION INTRAVENOUS AS NEEDED
Status: DISCONTINUED | OUTPATIENT
Start: 2025-06-13 | End: 2025-06-13

## 2025-06-13 RX ORDER — HYDROMORPHONE HYDROCHLORIDE 0.2 MG/ML
0.1 INJECTION INTRAMUSCULAR; INTRAVENOUS; SUBCUTANEOUS EVERY 5 MIN PRN
Status: DISCONTINUED | OUTPATIENT
Start: 2025-06-13 | End: 2025-06-13 | Stop reason: HOSPADM

## 2025-06-13 RX ORDER — METOPROLOL TARTRATE 1 MG/ML
INJECTION, SOLUTION INTRAVENOUS AS NEEDED
Status: DISCONTINUED | OUTPATIENT
Start: 2025-06-13 | End: 2025-06-13

## 2025-06-13 RX ORDER — SODIUM CHLORIDE, SODIUM LACTATE, POTASSIUM CHLORIDE, CALCIUM CHLORIDE 600; 310; 30; 20 MG/100ML; MG/100ML; MG/100ML; MG/100ML
100 INJECTION, SOLUTION INTRAVENOUS CONTINUOUS
Status: DISCONTINUED | OUTPATIENT
Start: 2025-06-13 | End: 2025-06-14 | Stop reason: HOSPADM

## 2025-06-13 RX ORDER — FENTANYL CITRATE 50 UG/ML
INJECTION, SOLUTION INTRAMUSCULAR; INTRAVENOUS AS NEEDED
Status: DISCONTINUED | OUTPATIENT
Start: 2025-06-13 | End: 2025-06-13

## 2025-06-13 RX ORDER — ONDANSETRON HYDROCHLORIDE 2 MG/ML
4 INJECTION, SOLUTION INTRAVENOUS EVERY 8 HOURS PRN
Status: DISCONTINUED | OUTPATIENT
Start: 2025-06-13 | End: 2025-06-14 | Stop reason: HOSPADM

## 2025-06-13 RX ORDER — HYDRALAZINE HYDROCHLORIDE 20 MG/ML
5 INJECTION INTRAMUSCULAR; INTRAVENOUS EVERY 30 MIN PRN
Status: DISCONTINUED | OUTPATIENT
Start: 2025-06-13 | End: 2025-06-13 | Stop reason: HOSPADM

## 2025-06-13 RX ORDER — ONDANSETRON 4 MG/1
4 TABLET, ORALLY DISINTEGRATING ORAL EVERY 8 HOURS PRN
Status: DISCONTINUED | OUTPATIENT
Start: 2025-06-13 | End: 2025-06-14 | Stop reason: HOSPADM

## 2025-06-13 RX ORDER — POLYETHYLENE GLYCOL 3350 17 G/17G
17 POWDER, FOR SOLUTION ORAL DAILY
Status: DISCONTINUED | OUTPATIENT
Start: 2025-06-13 | End: 2025-06-14 | Stop reason: HOSPADM

## 2025-06-13 RX ORDER — GABAPENTIN 300 MG/1
300 CAPSULE ORAL 2 TIMES DAILY
Status: DISCONTINUED | OUTPATIENT
Start: 2025-06-13 | End: 2025-06-14 | Stop reason: HOSPADM

## 2025-06-13 RX ORDER — LABETALOL HYDROCHLORIDE 5 MG/ML
INJECTION, SOLUTION INTRAVENOUS AS NEEDED
Status: DISCONTINUED | OUTPATIENT
Start: 2025-06-13 | End: 2025-06-13

## 2025-06-13 RX ORDER — ROCURONIUM BROMIDE 10 MG/ML
INJECTION, SOLUTION INTRAVENOUS AS NEEDED
Status: DISCONTINUED | OUTPATIENT
Start: 2025-06-13 | End: 2025-06-13

## 2025-06-13 RX ADMIN — OXYCODONE 10 MG: 5 TABLET ORAL at 20:03

## 2025-06-13 RX ADMIN — ESMOLOL HYDROCHLORIDE 20 MG: 10 INJECTION, SOLUTION INTRAVENOUS at 10:54

## 2025-06-13 RX ADMIN — CEFAZOLIN 2 G: 1 INJECTION, POWDER, FOR SOLUTION INTRAMUSCULAR; INTRAVENOUS at 10:28

## 2025-06-13 RX ADMIN — ONDANSETRON 4 MG: 2 INJECTION, SOLUTION INTRAMUSCULAR; INTRAVENOUS at 13:17

## 2025-06-13 RX ADMIN — ROCURONIUM BROMIDE 10 MG: 10 INJECTION, SOLUTION INTRAVENOUS at 12:29

## 2025-06-13 RX ADMIN — ONDANSETRON 4 MG: 2 INJECTION INTRAMUSCULAR; INTRAVENOUS at 14:34

## 2025-06-13 RX ADMIN — DEXAMETHASONE SODIUM PHOSPHATE 10 MG: 4 INJECTION INTRA-ARTICULAR; INTRALESIONAL; INTRAMUSCULAR; INTRAVENOUS; SOFT TISSUE at 10:28

## 2025-06-13 RX ADMIN — CEFAZOLIN SODIUM 2 G: 2 INJECTION, SOLUTION INTRAVENOUS at 16:34

## 2025-06-13 RX ADMIN — FENTANYL CITRATE 100 MCG: 50 INJECTION, SOLUTION INTRAMUSCULAR; INTRAVENOUS at 10:57

## 2025-06-13 RX ADMIN — MIDAZOLAM HYDROCHLORIDE 2 MG: 2 INJECTION, SOLUTION INTRAMUSCULAR; INTRAVENOUS at 10:05

## 2025-06-13 RX ADMIN — SODIUM CHLORIDE, SODIUM LACTATE, POTASSIUM CHLORIDE, AND CALCIUM CHLORIDE: 600; 310; 30; 20 INJECTION, SOLUTION INTRAVENOUS at 10:06

## 2025-06-13 RX ADMIN — Medication 20 MG: at 10:24

## 2025-06-13 RX ADMIN — DEXAMETHASONE SODIUM PHOSPHATE 10 MG: 10 INJECTION INTRAMUSCULAR; INTRAVENOUS at 16:34

## 2025-06-13 RX ADMIN — PROPOFOL 120 MG: 10 INJECTION, EMULSION INTRAVENOUS at 10:16

## 2025-06-13 RX ADMIN — APREPITANT 40 MG: 40 CAPSULE ORAL at 09:54

## 2025-06-13 RX ADMIN — LABETALOL HYDROCHLORIDE 10 MG: 5 INJECTION, SOLUTION INTRAVENOUS at 11:26

## 2025-06-13 RX ADMIN — ACETAMINOPHEN 975 MG: 325 TABLET ORAL at 18:27

## 2025-06-13 RX ADMIN — LIDOCAINE HYDROCHLORIDE 30 MG: 20 INJECTION, SOLUTION INFILTRATION; PERINEURAL at 10:16

## 2025-06-13 RX ADMIN — TRANEXAMIC ACID 1000 MG: 10 INJECTION, SOLUTION INTRAVENOUS at 10:28

## 2025-06-13 RX ADMIN — GABAPENTIN 300 MG: 300 CAPSULE ORAL at 20:03

## 2025-06-13 RX ADMIN — PROCHLORPERAZINE EDISYLATE 10 MG: 5 INJECTION INTRAMUSCULAR; INTRAVENOUS at 16:38

## 2025-06-13 RX ADMIN — PROPOFOL 30 MG: 10 INJECTION, EMULSION INTRAVENOUS at 10:37

## 2025-06-13 RX ADMIN — SUGAMMADEX 200 MG: 100 INJECTION, SOLUTION INTRAVENOUS at 13:24

## 2025-06-13 RX ADMIN — SENNOSIDES AND DOCUSATE SODIUM 2 TABLET: 50; 8.6 TABLET ORAL at 20:03

## 2025-06-13 RX ADMIN — METOPROLOL TARTRATE 2.5 MG: 5 INJECTION, SOLUTION INTRAVENOUS at 11:19

## 2025-06-13 RX ADMIN — SODIUM CHLORIDE: 9 INJECTION, SOLUTION INTRAVENOUS at 10:26

## 2025-06-13 RX ADMIN — HYDROMORPHONE HYDROCHLORIDE 0.5 MG: 1 INJECTION, SOLUTION INTRAMUSCULAR; INTRAVENOUS; SUBCUTANEOUS at 12:14

## 2025-06-13 RX ADMIN — METHOCARBAMOL 1000 MG: 500 TABLET ORAL at 20:03

## 2025-06-13 RX ADMIN — ROCURONIUM BROMIDE 20 MG: 10 INJECTION, SOLUTION INTRAVENOUS at 11:20

## 2025-06-13 RX ADMIN — FENTANYL CITRATE 50 MCG: 50 INJECTION, SOLUTION INTRAMUSCULAR; INTRAVENOUS at 10:37

## 2025-06-13 RX ADMIN — FENTANYL CITRATE 50 MCG: 50 INJECTION, SOLUTION INTRAMUSCULAR; INTRAVENOUS at 10:16

## 2025-06-13 RX ADMIN — Medication 2 L/MIN: at 13:45

## 2025-06-13 RX ADMIN — PROPOFOL 30 MG: 10 INJECTION, EMULSION INTRAVENOUS at 11:12

## 2025-06-13 RX ADMIN — Medication 30 MG: at 10:16

## 2025-06-13 RX ADMIN — HYDROMORPHONE HYDROCHLORIDE 0.2 MG: 0.2 INJECTION, SOLUTION INTRAMUSCULAR; INTRAVENOUS; SUBCUTANEOUS at 14:03

## 2025-06-13 RX ADMIN — HYDROMORPHONE HYDROCHLORIDE 0.5 MG: 1 INJECTION, SOLUTION INTRAMUSCULAR; INTRAVENOUS; SUBCUTANEOUS at 13:48

## 2025-06-13 RX ADMIN — ROCURONIUM BROMIDE 20 MG: 10 INJECTION, SOLUTION INTRAVENOUS at 10:35

## 2025-06-13 RX ADMIN — METHOCARBAMOL 1000 MG: 1000 INJECTION, SOLUTION INTRAMUSCULAR; INTRAVENOUS at 14:23

## 2025-06-13 RX ADMIN — GABAPENTIN 300 MG: 300 CAPSULE ORAL at 09:54

## 2025-06-13 RX ADMIN — ESMOLOL HYDROCHLORIDE 20 MG: 10 INJECTION, SOLUTION INTRAVENOUS at 11:13

## 2025-06-13 RX ADMIN — ROCURONIUM BROMIDE 60 MG: 10 INJECTION, SOLUTION INTRAVENOUS at 10:18

## 2025-06-13 RX ADMIN — HYDROMORPHONE HYDROCHLORIDE 0.5 MG: 1 INJECTION, SOLUTION INTRAMUSCULAR; INTRAVENOUS; SUBCUTANEOUS at 13:37

## 2025-06-13 RX ADMIN — POVIDONE-IODINE 1 APPLICATION: 5 SOLUTION TOPICAL at 08:38

## 2025-06-13 RX ADMIN — SCOPOLAMINE 1 PATCH: 1.5 PATCH, EXTENDED RELEASE TRANSDERMAL at 18:27

## 2025-06-13 RX ADMIN — OXYCODONE 5 MG: 5 TABLET ORAL at 14:24

## 2025-06-13 RX ADMIN — ACETAMINOPHEN 975 MG: 325 TABLET, FILM COATED ORAL at 09:54

## 2025-06-13 RX ADMIN — PROPOFOL 50 MG: 10 INJECTION, EMULSION INTRAVENOUS at 10:42

## 2025-06-13 RX ADMIN — PROPOFOL 75 MCG/KG/MIN: 10 INJECTION, EMULSION INTRAVENOUS at 10:24

## 2025-06-13 RX ADMIN — CEFAZOLIN SODIUM 2 G: 2 INJECTION, SOLUTION INTRAVENOUS at 23:04

## 2025-06-13 RX ADMIN — DEXAMETHASONE SODIUM PHOSPHATE 10 MG: 10 INJECTION INTRAMUSCULAR; INTRAVENOUS at 23:05

## 2025-06-13 RX ADMIN — ROCURONIUM BROMIDE 10 MG: 10 INJECTION, SOLUTION INTRAVENOUS at 12:07

## 2025-06-13 SDOH — SOCIAL STABILITY: SOCIAL INSECURITY: ABUSE: ADULT

## 2025-06-13 SDOH — ECONOMIC STABILITY: HOUSING INSECURITY: IN THE PAST 12 MONTHS, HOW MANY TIMES HAVE YOU MOVED WHERE YOU WERE LIVING?: 0

## 2025-06-13 SDOH — SOCIAL STABILITY: SOCIAL INSECURITY
WITHIN THE LAST YEAR, HAVE YOU BEEN KICKED, HIT, SLAPPED, OR OTHERWISE PHYSICALLY HURT BY YOUR PARTNER OR EX-PARTNER?: NO

## 2025-06-13 SDOH — SOCIAL STABILITY: SOCIAL INSECURITY: DO YOU FEEL ANYONE HAS EXPLOITED OR TAKEN ADVANTAGE OF YOU FINANCIALLY OR OF YOUR PERSONAL PROPERTY?: NO

## 2025-06-13 SDOH — SOCIAL STABILITY: SOCIAL INSECURITY: HAVE YOU HAD THOUGHTS OF HARMING ANYONE ELSE?: NO

## 2025-06-13 SDOH — ECONOMIC STABILITY: HOUSING INSECURITY: AT ANY TIME IN THE PAST 12 MONTHS, WERE YOU HOMELESS OR LIVING IN A SHELTER (INCLUDING NOW)?: NO

## 2025-06-13 SDOH — SOCIAL STABILITY: SOCIAL INSECURITY: WITHIN THE LAST YEAR, HAVE YOU BEEN HUMILIATED OR EMOTIONALLY ABUSED IN OTHER WAYS BY YOUR PARTNER OR EX-PARTNER?: NO

## 2025-06-13 SDOH — SOCIAL STABILITY: SOCIAL INSECURITY: HAS ANYONE EVER THREATENED TO HURT YOUR FAMILY OR YOUR PETS?: NO

## 2025-06-13 SDOH — SOCIAL STABILITY: SOCIAL INSECURITY: WITHIN THE LAST YEAR, HAVE YOU BEEN AFRAID OF YOUR PARTNER OR EX-PARTNER?: NO

## 2025-06-13 SDOH — ECONOMIC STABILITY: TRANSPORTATION INSECURITY: IN THE PAST 12 MONTHS, HAS LACK OF TRANSPORTATION KEPT YOU FROM MEDICAL APPOINTMENTS OR FROM GETTING MEDICATIONS?: NO

## 2025-06-13 SDOH — ECONOMIC STABILITY: FOOD INSECURITY: WITHIN THE PAST 12 MONTHS, YOU WORRIED THAT YOUR FOOD WOULD RUN OUT BEFORE YOU GOT THE MONEY TO BUY MORE.: NEVER TRUE

## 2025-06-13 SDOH — ECONOMIC STABILITY: INCOME INSECURITY: IN THE PAST 12 MONTHS HAS THE ELECTRIC, GAS, OIL, OR WATER COMPANY THREATENED TO SHUT OFF SERVICES IN YOUR HOME?: NO

## 2025-06-13 SDOH — SOCIAL STABILITY: SOCIAL INSECURITY
WITHIN THE LAST YEAR, HAVE YOU BEEN RAPED OR FORCED TO HAVE ANY KIND OF SEXUAL ACTIVITY BY YOUR PARTNER OR EX-PARTNER?: NO

## 2025-06-13 SDOH — ECONOMIC STABILITY: FOOD INSECURITY: WITHIN THE PAST 12 MONTHS, THE FOOD YOU BOUGHT JUST DIDN'T LAST AND YOU DIDN'T HAVE MONEY TO GET MORE.: NEVER TRUE

## 2025-06-13 SDOH — ECONOMIC STABILITY: HOUSING INSECURITY: IN THE LAST 12 MONTHS, WAS THERE A TIME WHEN YOU WERE NOT ABLE TO PAY THE MORTGAGE OR RENT ON TIME?: NO

## 2025-06-13 SDOH — SOCIAL STABILITY: SOCIAL INSECURITY: DO YOU FEEL UNSAFE GOING BACK TO THE PLACE WHERE YOU ARE LIVING?: NO

## 2025-06-13 SDOH — SOCIAL STABILITY: SOCIAL INSECURITY: ARE THERE ANY APPARENT SIGNS OF INJURIES/BEHAVIORS THAT COULD BE RELATED TO ABUSE/NEGLECT?: NO

## 2025-06-13 SDOH — SOCIAL STABILITY: SOCIAL INSECURITY: WERE YOU ABLE TO COMPLETE ALL THE BEHAVIORAL HEALTH SCREENINGS?: YES

## 2025-06-13 SDOH — SOCIAL STABILITY: SOCIAL INSECURITY: DOES ANYONE TRY TO KEEP YOU FROM HAVING/CONTACTING OTHER FRIENDS OR DOING THINGS OUTSIDE YOUR HOME?: NO

## 2025-06-13 SDOH — SOCIAL STABILITY: SOCIAL INSECURITY: ARE YOU OR HAVE YOU BEEN THREATENED OR ABUSED PHYSICALLY, EMOTIONALLY, OR SEXUALLY BY ANYONE?: NO

## 2025-06-13 SDOH — SOCIAL STABILITY: SOCIAL INSECURITY: HAVE YOU HAD ANY THOUGHTS OF HARMING ANYONE ELSE?: NO

## 2025-06-13 ASSESSMENT — PAIN - FUNCTIONAL ASSESSMENT
PAIN_FUNCTIONAL_ASSESSMENT: 0-10

## 2025-06-13 ASSESSMENT — COGNITIVE AND FUNCTIONAL STATUS - GENERAL
STANDING UP FROM CHAIR USING ARMS: A LITTLE
TOILETING: A LITTLE
TURNING FROM BACK TO SIDE WHILE IN FLAT BAD: A LITTLE
MOVING TO AND FROM BED TO CHAIR: A LITTLE
TURNING FROM BACK TO SIDE WHILE IN FLAT BAD: A LITTLE
DRESSING REGULAR LOWER BODY CLOTHING: A LITTLE
MOVING TO AND FROM BED TO CHAIR: A LITTLE
CLIMB 3 TO 5 STEPS WITH RAILING: A LITTLE
HELP NEEDED FOR BATHING: A LITTLE
DAILY ACTIVITIY SCORE: 20
TURNING FROM BACK TO SIDE WHILE IN FLAT BAD: A LITTLE
MOVING FROM LYING ON BACK TO SITTING ON SIDE OF FLAT BED WITH BEDRAILS: A LITTLE
DRESSING REGULAR UPPER BODY CLOTHING: A LITTLE
CLIMB 3 TO 5 STEPS WITH RAILING: TOTAL
HELP NEEDED FOR BATHING: A LITTLE
STANDING UP FROM CHAIR USING ARMS: A LITTLE
TOILETING: A LITTLE
MOVING FROM LYING ON BACK TO SITTING ON SIDE OF FLAT BED WITH BEDRAILS: A LITTLE
WALKING IN HOSPITAL ROOM: A LITTLE
MOBILITY SCORE: 18
HELP NEEDED FOR BATHING: A LITTLE
MOVING TO AND FROM BED TO CHAIR: A LITTLE
STANDING UP FROM CHAIR USING ARMS: A LITTLE
MOBILITY SCORE: 18
CLIMB 3 TO 5 STEPS WITH RAILING: A LITTLE
MOBILITY SCORE: 18
DAILY ACTIVITIY SCORE: 20
MOVING TO AND FROM BED TO CHAIR: A LITTLE
PATIENT BASELINE BEDBOUND: NO
TOILETING: A LITTLE
WALKING IN HOSPITAL ROOM: A LITTLE
MOVING FROM LYING ON BACK TO SITTING ON SIDE OF FLAT BED WITH BEDRAILS: A LITTLE
DRESSING REGULAR LOWER BODY CLOTHING: A LITTLE
DRESSING REGULAR LOWER BODY CLOTHING: A LITTLE
TURNING FROM BACK TO SIDE WHILE IN FLAT BAD: A LITTLE
MOBILITY SCORE: 16
MOVING FROM LYING ON BACK TO SITTING ON SIDE OF FLAT BED WITH BEDRAILS: A LITTLE
DAILY ACTIVITIY SCORE: 20
CLIMB 3 TO 5 STEPS WITH RAILING: A LITTLE
DRESSING REGULAR UPPER BODY CLOTHING: A LITTLE
DRESSING REGULAR UPPER BODY CLOTHING: A LITTLE
STANDING UP FROM CHAIR USING ARMS: A LITTLE
WALKING IN HOSPITAL ROOM: A LITTLE
WALKING IN HOSPITAL ROOM: A LITTLE

## 2025-06-13 ASSESSMENT — ACTIVITIES OF DAILY LIVING (ADL)
LACK_OF_TRANSPORTATION: NO
DRESSING YOURSELF: INDEPENDENT
PATIENT'S MEMORY ADEQUATE TO SAFELY COMPLETE DAILY ACTIVITIES?: YES
LACK_OF_TRANSPORTATION: NO
WALKS IN HOME: INDEPENDENT
JUDGMENT_ADEQUATE_SAFELY_COMPLETE_DAILY_ACTIVITIES: YES
LACK_OF_TRANSPORTATION: NO
FEEDING YOURSELF: INDEPENDENT
ADEQUATE_TO_COMPLETE_ADL: YES
TOILETING: INDEPENDENT
BATHING: INDEPENDENT
ADL_ASSISTANCE: INDEPENDENT
GROOMING: INDEPENDENT
HEARING - RIGHT EAR: FUNCTIONAL
HEARING - LEFT EAR: FUNCTIONAL

## 2025-06-13 ASSESSMENT — LIFESTYLE VARIABLES
HOW OFTEN DO YOU HAVE 6 OR MORE DRINKS ON ONE OCCASION: LESS THAN MONTHLY
SKIP TO QUESTIONS 9-10: 0
HOW OFTEN DO YOU HAVE A DRINK CONTAINING ALCOHOL: 2-4 TIMES A MONTH
AUDIT-C TOTAL SCORE: 3
AUDIT-C TOTAL SCORE: 3
HOW MANY STANDARD DRINKS CONTAINING ALCOHOL DO YOU HAVE ON A TYPICAL DAY: 1 OR 2

## 2025-06-13 ASSESSMENT — PAIN SCALES - GENERAL
PAINLEVEL_OUTOF10: 8
PAINLEVEL_OUTOF10: 6
PAINLEVEL_OUTOF10: 6
PAINLEVEL_OUTOF10: 2
PAINLEVEL_OUTOF10: 6
PAINLEVEL_OUTOF10: 4
PAINLEVEL_OUTOF10: 3
PAINLEVEL_OUTOF10: 7
PAINLEVEL_OUTOF10: 5 - MODERATE PAIN
PAIN_LEVEL: 3

## 2025-06-13 ASSESSMENT — PAIN DESCRIPTION - DESCRIPTORS: DESCRIPTORS: ACHING

## 2025-06-13 ASSESSMENT — PATIENT HEALTH QUESTIONNAIRE - PHQ9
SUM OF ALL RESPONSES TO PHQ9 QUESTIONS 1 & 2: 0
1. LITTLE INTEREST OR PLEASURE IN DOING THINGS: NOT AT ALL
2. FEELING DOWN, DEPRESSED OR HOPELESS: NOT AT ALL

## 2025-06-13 NOTE — OP NOTE
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 [M99.41]     * Herniation of cervical intervertebral disc with radiculopathy [M50.10]     Procedures  DISCECTOMY, SPINE, CERVICAL, ANTERIOR APPROACH, WITH FUSION  28027 - RI ARTHRD ANT INTERBODY DECOMPRESS CERVICAL BELW C2    DISCECTOMY, SPINE, CERVICAL, ANTERIOR APPROACH, WITH FUSION  38145 - RI ARTHRD ANT INTERDY CERVCL BELW C2 EA ADDL NTRSPC    DISCECTOMY, SPINE, CERVICAL, ANTERIOR APPROACH, WITH FUSION  87324 - RI ALLOGRAFT FOR SPINE SURGERY ONLY STRUCTURAL    DISCECTOMY, SPINE, CERVICAL, ANTERIOR APPROACH, WITH FUSION  13041 - RI ALLOGRAFT FOR SPINE SURGERY ONLY MORSELIZED    DISCECTOMY, SPINE, CERVICAL, ANTERIOR APPROACH, WITH FUSION  27124 - RI ANTERIOR INSTRUMENTATION 2-3 VERTEBRAL SEGMENTS    1. C5-6 and C6-7 anterior cervical diskectomy and interbody fusion.  2. Application of 7 and 6 mm Cornerstone lordotic 14 x 11 interbody allograft spacer and DBX allograft putty at C5-6 and C6-7 respectively  3. C5-7 anterior plating using 37 mm Medtronic Zevo plate.    Modifier 22 was used due to patient's body habitus and larger neck size longer than usual time was spend dissecting down to the anterior cervical spine to visualize the spine. Additionally using longer than usual instrumentations to reach down to the spine.      Surgeons      * Angela Collado - Primary    Resident/Fellow/Other Assistant:  Josias Issa MD Orthopaedic Spine Fellow    Staff: "   Circulator: Katlyn  Scrub Person: Dena Foster Circulator: Sharisse  Relief Scrub: Bruno    Anesthesia Staff: Anesthesiologist: Claudia Corral MD  Anesthesia Resident: Stephen Hi DO    Procedure Summary  Anesthesia: General  ASA: III  Estimated Blood Loss: 10mL  Intra-op Medications:   Administrations occurring from 1000 to 1340 on 06/13/25:   Medication Name Total Dose   sodium chloride 0.9 % irrigation solution 1,000 mL   thrombin-recombinant (Recothrom) 5,000 unit topical solution 10,000 Units   gelatin absorbable (Gelfoam) 100 sponge 1 each   ceFAZolin (Ancef) vial 1 g 2 g   dexAMETHasone (Decadron) 4 mg/mL 10 mg   esmolol 10 mg/mL 40 mg   fentaNYL (Sublimaze) injection 50 mcg/mL 200 mcg   HYDROmorphone (Dilaudid) injection 1 mg/mL 1 mg   ketamine injection 50 mg/ 5 mL (10 mg/mL) 50 mg   labetalol 5 mg/mL 10 mg   LR bolus Cannot be calculated   lidocaine (Xylocaine) injection 2 % 30 mg   metoprolol 5 mg/5 mL 2.5 mg   midazolam PF (Versed) injection 1 mg/mL 2 mg   ondansetron 2 mg/mL 4 mg   propofol (Diprivan) injection 10 mg/mL 1,485.01 mg   rocuronium (ZeMuron) 50 mg/5 mL injection 120 mg   NaCl 0.9 % bolus Cannot be calculated   sugammadex (Bridion) 200 mg/2 mL injection 200 mg   tranexamic acid 1,000 mg/100 mL NS (premix) 1,000 mg              Anesthesia Record               Intraprocedure I/O Totals          Intake    LR bolus 700.00 mL    NaCl 0.9 % bolus 500.00 mL    Tranexamic Acid 0.00 mL    The total shown is the total volume documented since Anesthesia Start was filed.    Total Intake 1200 mL       Output    Est. Blood Loss 10 mL    Total Output 10 mL       Net    Net Volume 1190 mL          Specimen: No specimens collected              Drains and/or Catheters:   Closed/Suction Drain Anterior Neck Bulb 15 Fr. (Active)   Dressing Status Clean;Dry;Occlusive 06/13/25 1335   Drainage Appearance Bloody 06/13/25 1335   Status To bulb suction 06/13/25 1335       Tourniquet Times:      "    Implants:  Implants       Type Name Action Serial No.      Spinal Hardware PIN, DISTRACTION, CERVICAL, 16 MM, STAINLESS STEEL, DISPOSABLE, STERILE - [id number] Implanted      Spinal Hardware PIN, DISTRACTION, CERVICAL, 16 MM, STAINLESS STEEL, DISPOSABLE, STERILE - [id number] Implanted      Graft PUTTY, DBX BONE 0.5ML - [id number] Implanted      Graft BONE, BLOCK CORTICAL 6 X 14 X 11 - [id number] - [id number] Implanted [id number]     Graft BONE, BLOCK CORTICAL 7 X 14 X 11 - [id number] - [id number] Implanted [id number]     Screw PLATE, ZEVO, 37MM, 2 LEVEL - [id number] Implanted      Screw SCREW, ZEVO, FREDIS SD, 3.5 X 15MM - [id number] Implanted       Plate holding pin Implanted               Findings: Herniated disc with disruption of PLL at C5-6 and C6-7    Indications: Esther Nicole \"Renny" is an 34 y.o. female who has been having right radicular arm pain since January 2025 for the past 6 months secondary to cervical disc herniation at C5-C6 C6-C7 with flattening of the spinal cord.  She been also having weakness of  and use of her triceps on the right side.  She has tried physical therapy as well as epidural steroid injection without any significant long-term relief.  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  Given patient's finding and symptoms recommend surgery for C5-C6 C6-C7 anterior cervical discectomy and fusion.  I discussed the risks of surgery which includes but not limited to infection at the surgical site or wound healing requiring additional surgery to clean the infection and long term IV antibiotics. There is risk of blood loss requiring blood transfusion. Risk of dural tear requiring repair and possible continue cerebral spinal fluid leakage and positional headaches. Risk of Nerve root injury resulting in temporary or permeant weakness, numbness, or radicular pain such as a nerve root palsy. Risk of epidural hematoma and spinal cord compression resulting " in weakness in extremity and bowel and bladder disturbances requiring additional surgery to clear out hematoma. Risk of retropharyngeal hematoma causing airway comprise and need for emergent surgery for evacuation. Risk of nonunion or hardware failure requiring additional surgery to correct this. Risk of adjacent level disease in the future requiring additional surgery in the future to address this. Risk of needing to remain intubated after surgery for facial swelling. Risk of dysphagia or difficulty swallowing requiring feeding tube breifly. Risks of developing MI, stroke, DVT, PE during or after surgery. Risk of death. Risk of vascular injury or esophageal injury. Risk of complications from anesthesia requiring prolong intubation. Risk of birgit coronavirus and the complications associated with that while in the hospital. The patient concurred with the proposed plan, giving informed consent.  The site of surgery was properly noted/marked if necessary per policy. The patient has been actively warmed in preoperative area. Preoperative antibiotics have been ordered and given within 1 hours of incision. Venous thrombosis prophylaxis are not indicated.    Procedure Details: The patient was identified in preoperative holding area by the patient's name, medical record number, and date of birth. Consent was reviewed with the patient and questions were answered. Patient was brought to the operating room, and a huddle was performed where we verified the patient's name, medical record number, and date of birth. We verified the procedure and preoperative antibiotics. Once everyone was in agreement, the patient was moved to the jose table and position in a supine position. General anesthesia was administered and patient was intubated without any difficulty. Intravenous antibiotics in the form of Ancef was administered for surgical prophylaxis. Sequential compressive devices were placed on the lower extremities. Care  was taken to ensure that all bony prominences were carefully padded. A bump was placed under the patient's shoulder blades to extend the neck to help with exposure. Electrodiagnostic electrodes were placed and SSEP  baseline were obtained which were normal. Fluoroscopic imaging was used to identify and melinda the appropriate level at C6. The anterior cervical spine was prepped and draped in the usual sterile fashion.     A pre incision pause was performed, where we verified the patient's name, medical record number, procedure, and administration of antibiotics. Once everyone were in agreement, we proceeded with the surgery. A transverse skin incision centered over the C6. Incision was carried down from the midline to the medial border of the sternocleidomastoid.  We encountered a layer subcutaneous fat and dissection was taken down to the platysma and platysma flaps were raised.  There was a large vein in the middle of the surgical field which was carefully dissected and retracted laterally.  The plane anteromedial to the sternocleidomastoid was then dissected bluntly between the carotid sheath and the esophagus through the various layers of the deep cervical fascia to the prevertebral fascia.  Patient had a thick prevertebral fascia.  Longer instrumentation such as the Bovie tip and suctions were required for the deeper dissection.  Once we cleared out the prevertebral fascia, we were able to identify the surface of the anterior cervical vertebrae and disc. Appropriate levels were then confirmed with fluoroscopy on lateral view.     I first turned my attention to C6-C7.  The longus coli muscle was then dissected in the midline out laterally from the inferior part of the C 6 to the superior part of the C 7 vertebral bodies. Self-retaining retractors were placed deep to the longus coli muscle. I then proceeded with C6-7 anterior cervical diskectomy. Carlos pins were placed in the C6 and C7 vertebral body. The operative  microscope was then brought into the field. The anterior anulus at C6-7 was incised and the disk was removed piecemeal with the use of pituitary rongeurs. Osteophytes overhanging the inferior C6 and superior C7 vertebral bodies were removed with rongeurs. Distraction was achieved across the [place] pins. Curette was used to resect the disc back to the level of the posterior endplates and out to the uncus. I removed all the collagenous portion of the endplates and was able to resect back to the level of the posterior body endplate. The PLL was carefully removed with kerrison rongeurs. SSEPs were stable and similar to baseline.     I then proceeded with interbody arthrodesis. Under lateral fluoroscopic guidance, I trailed the spacers until a solid press-fit was achieved with a 6 mm lordotic interbody spacer. The interbody structural allograft was filled with DBX allograft putty and then tapped into position and slightly counter sunked well to the anterior cortex of the cervical vertebrae. Distraction across the disc space was removed. A solid press-fit was achieved. I then obtained radiograph image in AP and lateral view to verify the position of the graft and our levels.     Next I turn my attention to C5-C6. The retractors were adjusted over C5-6. Carlos pin from C7 was moved to C5. The anterior anulus at C5-6 was incised and the disk was removed piecemeal with the use of pituitary rongeurs. Osteophytes overhanging the inferior C5 and superior C6 vertebral bodies were removed with rongeurs. Distraction was achieved across the [place] pins. Curette was used to resect the disc back to the level of the posterior endplates and out to the uncus. I removed all the collagenous portion of the endplates and was able to resect back to the level of the posterior body endplate. The PLL was carefully removed with kerrison rongeurs. SSEPs were stable and similar to baseline.     I then proceeded with interbody arthrodesis. Under  lateral fluoroscopic guidance, I trailed the spacers until a solid press-fit was achieved with a 7 mm lordotic interbody spacer. The interbody structural allograft was filled with DBX allograft putty and then tapped into position and slightly counter sunked well to the anterior cortex of the cervical vertebrae. Distraction across the disc space was removed. A solid press-fit was achieved. I then obtained radiograph image in AP and lateral view to verify the position of the graft and our levels.     Once I felt the graft was in acceptable position, Steele pins were removed. We then selected a Medtronic Zevo 37mm anterior cervical plate as the best fit. The plate was positioned over C5-7 and I verified the positioning radiographically with orthogonal views. Once I were happy with the positioning of our plate, I proceeded with placement of the screws and were able to obtain good bony purchase. The screws were then locked into place. Electrodiagnostic monitoring for SSEPs remained stable throughout the case.     I then obtained final radiographs with orthogonal radiographs to confirm our level as well as positioning of our hardware once again. SSEP were stable and same as baseline after placement of the graft and plate. The wound was thoroughly irrigated with normal saline. Hemostasis was secured. I then inspected the soft tissue of the neck as well as esophagus and could not identify any areas of concern. A 10-round Albanian JIMMY drain was placed and the wound was closed over the drain. The platysma was closed with 2-0 Vicryl. The subdermis with 3-0 Vicryl and skin with 3-0 Monocryl followed by Dermabond. A sterile nonadherent dressing was applied. The patient was placed in a cervical collar. Patient was awoken from anesthesia and extubated without any difficulty. Patient was transferred to the hospital bed and was transferred out of the operating room to the postop holding area in stable condition.     All sponge and  needle counts were correct at the completion of the procedure. There were no immediate complications noted. I was present for the entire surgical case.        POSTOPERATIVE PLAN: The patient will be admitted overnight for observation for drain management. Patient will receive postoperative antibiotics as well as 24 hours of Decadron. We will monitor drain output closely. Patient will work with Physical Therapy and Occupational Therapy. Patient will follow up in 3 weeks for wound check.    Evidence of Infection: No   Complications:  None; patient tolerated the procedure well.    Disposition: PACU - hemodynamically stable.  Condition: stable         Attending Attestation: I was present and scrubbed for the entire procedure.    Angela Collado  Phone Number: [phone]

## 2025-06-13 NOTE — ANESTHESIA PROCEDURE NOTES
Airway  Date/Time: 6/13/2025 10:22 AM  Reason: elective    Airway not difficult    Staffing  Performed: resident   Authorized by: Claudia Corral MD    Performed by: Stephen Hi DO  Patient location during procedure: OR    Patient Condition  Indications for airway management: anesthesia  Patient position: sniffing  Planned trial extubation  Sedation level: deep     Final Airway Details   Preoxygenated: yes  Final airway type: endotracheal airway  Successful airway: ETT  Cuffed: yes   Successful intubation technique: flexible bronchoscopy  Intubation device: Ovasapian oral airway.  Endotracheal tube insertion site: oral  ETT size (mm): 7.0  Placement verified by: bronchoscopy and capnometry   Measured from: lips  ETT to lips (cm): 21  Number of attempts at approach: 1    Additional Comments  Elective asleep FOB intubation. Ovasapian oral airway used to facilitate intubation. Mauri flex tip size 7.0. Atraumatic. Neck neutral. No aspiration observed.

## 2025-06-13 NOTE — ANESTHESIA PREPROCEDURE EVALUATION
"Patient: Esther Nicole \"Bebe\"    Procedure Information       Date/Time: 06/13/25 1000    Procedure: DISCECTOMY, SPINE, CERVICAL, ANTERIOR APPROACH, WITH FUSION (Neck)    Location: [facility] OR 07 / [facility] OR    Surgeons: Angela Collado MD            Relevant Problems   Pulmonary   (+) Moderate persistent asthma without complication (HHS-HCC)      Neuro   (+) Anxiety   (+) Cervical radiculopathy   (+) Herniation of cervical intervertebral disc with radiculopathy      Musculoskeletal   (+) Connective tissue stenosis of neural canal of cervical region   (+) Degeneration of C5-C6 intervertebral disc      ID   (+) Genital herpes simplex      Skin   (+) Atopic dermatitis       Clinical information reviewed:   Tobacco  Allergies  Meds   Med Hx  Surg Hx  OB Status  Fam Hx  Soc   Hx        NPO Detail:  NPO/Void Status  Date of Last Liquid: 06/12/25  Time of Last Liquid: 2030  Date of Last Solid: 06/12/25  Time of Last Solid: 2030           Chemistry    Lab Results   Component Value Date/Time     06/06/2025 0818     03/28/2025 1339    K 4.8 06/06/2025 0818    K 4.3 03/28/2025 1339     06/06/2025 0818     03/28/2025 1339    CO2 28 06/06/2025 0818    CO2 30 03/28/2025 1339    BUN 18 06/06/2025 0818    BUN 17 03/28/2025 1339    CREATININE 0.84 06/06/2025 0818    CREATININE 0.68 03/28/2025 1339    Lab Results   Component Value Date/Time    CALCIUM 9.3 06/06/2025 0818    CALCIUM 9.4 03/28/2025 1339    ALKPHOS 60 03/28/2025 1339    AST 12 03/28/2025 1339    ALT 19 03/28/2025 1339    BILITOT 0.8 03/28/2025 1339          Lab Results   Component Value Date/Time    WBC 7.2 06/06/2025 0818    HGB 14.0 06/06/2025 0818    HCT 41.7 06/06/2025 0818     06/06/2025 0818     Lab Results   Component Value Date/Time    PROTIME 9.5 (L) 06/06/2025 0818    INR 0.9 06/06/2025 0818     No results found for this or any previous visit (from the past 4464 hours).  No results found for this or " any previous visit from the past 1095 days.  Vitals:    06/13/25 0825   BP: (!) 165/91   Pulse: 86   Resp: 16   Temp: 36.6 °C (97.9 °F)   SpO2: 97%       Physical Exam    Airway  Mallampati: III  TM distance: >3 FB  Neck ROM: full  Mouth opening: 3 or more finger widths     Cardiovascular - normal exam   Dental - normal exam     Pulmonary Breath sounds clear to auscultation     Abdominal - normal exam           Anesthesia Plan    History of general anesthesia?: no  History of complications of general anesthesia?: unknown/emergency    ASA 3     general     intravenous induction   Postoperative pain plan includes opioids.  Trial extubation is planned.  Anesthetic plan and risks discussed with patient.  Use of blood products discussed with patient who consented to blood products.    Plan discussed with resident.

## 2025-06-13 NOTE — DISCHARGE INSTRUCTIONS
**Please call Dr. Angela Collado (at [phone]) or Bety De La Garza RN (at [phone]) for any          postoperative questions or concerns.

## 2025-06-13 NOTE — CARE PLAN
Problem: Pain - Adult  Goal: Verbalizes/displays adequate comfort level or baseline comfort level  Outcome: Progressing     Problem: Safety - Adult  Goal: Free from fall injury  Outcome: Progressing   The patient's goals for the shift include      The clinical goals for the shift include safety, pain control    Patient arrived at 1520. Vitals stable. Having nausea/vomiting. PRNs administered and MD notified. Pain adequately controlled. Dressing intact. Voiding without difficulty. Ambulating with standby assist. JIMMY intact, output monitored. Patient educated about holding birth control dose this evening per Pharmacist. Resting at present.

## 2025-06-13 NOTE — PROGRESS NOTES
"Pharmacy Medication History Review    Esther Nicole \"Renny" is a 34 y.o. female admitted for Connective tissue stenosis of neural canal of cervical region. Pharmacy reviewed the patient's [id number] medications and allergies for accuracy.    Medications ADDED:  None  Medications CHANGED:  Gabapentin  Medications REMOVED:   Ibuprofen     The list below reflects the updated PTA list.   Prior to Admission Medications   Prescriptions Last Dose Informant   baclofen (Lioresal) 5 mg tablet Past Week Self   Sig: Take 1 tablet (5 mg) by mouth 3 times a day.  Recent increase to 10 mg three times a day, but pt had not started new dose    chlorhexidine (Hibiclens) 4 % external liquid 6/13/2025 Morning Self   Sig: Use as directed daily preoperatively for 5 days leading up to surgery, wash body all over not on face or genital region, let sit on skin for 3 minutes before rising.   chlorhexidine (Peridex) 0.12 % solution 6/13/2025 Morning Self   Sig: Swish and spit with 15ml of solution the night before and morning of surgery. Do not swallow.   cholecalciferol (Vitamin D3) 50 mcg (2,000 units) capsule Past Week Self   Sig: Take 1 capsule (50 mcg) by mouth every other day.   gabapentin (Neurontin) 300 mg capsule 6/12/2025 Self   Sig: Take 1 capsule (300 mg) by mouth 2 times a day.   Patient taking differently: Take 1 capsule (300 mg) by mouth 3 times a day.   medical cannabis Past Week    Sig: Take 1 each by mouth once daily as needed (As needed for pain). Gummy and/ or flower   norgestimate-ethinyl estradioL (Ortho Tri-Cyclen LO) 0.18/0.215/0.25 mg-25 mcg tablet 6/12/2025    Sig: Take 1 tablet by mouth once daily.   norgestimate-ethinyl estradioL (Ortho Tri-Cyclen LO) 0.18/0.215/0.25 mg-0.025 mg tablet 6/12/2025 Self   Sig: Take 1 tablet by mouth once daily.  duplicate       Facility-Administered Medications: None        The list below reflects the updated allergy list. Please review each documented allergy for " "additional clarification and justification.  Allergies  Reviewed by Dena Fischer, RN on 6/13/2025   No Known Allergies         Patient accepts M2B at discharge.     Sources:   Patient interview (good historian)  [facility]  Care Everywhere  Chart Review  Medication Dispense History    Additional Comments:  None to note    Valarie Levine, [facility]  Transitions of Care Pharmacist  06/13/25     Secure Chat preferred   If no response call [phone] or Vocera \"Med Rec\"    "

## 2025-06-13 NOTE — ANESTHESIA PROCEDURE NOTES
Peripheral IV  Date/Time: 6/13/2025 10:27 AM      Placement  Needle size: 18 G  Laterality: right  Location: hand  Local anesthetic: none  Site prep: chlorhexidine  Technique: anatomical landmarks

## 2025-06-13 NOTE — PROGRESS NOTES
"Physical Therapy    Physical Therapy Evaluation    Patient Name: Esther Nicole \"Bebe\"  MRN: [id number]  Department: [place] [id number]  Room: [address] [place], [place] [address]  Today's Date: 6/13/2025        Assessment/Plan   PT Assessment  PT Assessment Results: Impaired balance, Decreased mobility, Decreased cognition, Impaired judgement, Decreased safety awareness, Pain  Rehab Prognosis: Excellent  Barriers to Discharge Home: No anticipated barriers  Evaluation/Treatment Tolerance: Patient tolerated treatment well (partially limited by cognition level and nausea)  End of Session Communication: Bedside nurse  Assessment Comment: Patient presents today s/p S/p C5-7 ACDF. She demonstrates decreased activity tolerance, impaired balance, and impaired cognition. She would benefit from low intensity therapy to improve post-surgical impairments and improve funcitonal mobility.  End of Session Patient Position: Bed, 3 rail up, Alarm on  IP OR SWING BED PT PLAN  Inpatient or Swing Bed: Inpatient  PT Plan  Treatment/Interventions: Bed mobility, Transfer training, Gait training, Stair training, Balance training, Neuromuscular re-education, Strengthening, Endurance training, Range of motion, Therapeutic exercise, Therapeutic activity, Home exercise program, Postural re-education  PT Plan: Ongoing PT  PT Frequency: 5 times per week  PT Discharge Recommendations: Low intensity level of continued care  Equipment Recommended upon Discharge:  (TBD-anticipated none, but required a wheeled walker today)  PT Recommended Transfer Status: Assist x1  PT - OK to Discharge: Yes    Subjective     PT Visit Info:  PT Received On: 06/13/25  General Visit Information:  General  Reason for Referral: Patient presents S/p 06/13 C5-7 ACDF  Past Medical History Relevant to Rehab: None  Family/Caregiver Present: No  Prior to Session Communication: Bedside nurse  Patient Position Received: Bed, 3 rail up, Alarm on  General Comment: Patient nauseous at beginning of session, RN " aware. Patient with alerted mental status which RN is aware of.  Home Living:  Home Living  Type of Home: [place]  Lives With: Significant other (boyfriend Frantz)  Home Adaptive Equipment: None  Home Layout: Two level, Bed/bath upstairs, Stairs to alternate level with rails  Alternate Level Stairs-Rails: Right  Alternate Level Stairs-Number of Steps: 14  Home Access: Level entry  Bathroom Shower/Tub: Tub/shower unit  Bathroom Toilet: Standard  Bathroom Equipment: None  Prior Level of Function:  Prior Function Per Pt/Caregiver Report  Level of [place]: Independent with ADLs and functional transfers  Receives Help From: Family  ADL Assistance: Independent  Homemaking Assistance: Independent  Ambulatory Assistance: Independent (community ambulator with no AD)  Vocational: Full time 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, but works at a dentist office)  Hand Dominance: Right  Prior Function Comments: (+) drives; (-) falls; patients prior symptoms included R UE numbness, pain and decreased  strength  Precautions:  Precautions  Medical Precautions: Fall precautions  Post-Surgical Precautions: Spinal precautions     Objective   Pain:  Pain Assessment  Pain Assessment: 0-10  0-10 (Numeric) Pain Score: 6 (6/10 at beginning of session and maintained throughout session.)  Pain Type: Surgical pain  Pain Location: Neck  Cognition:  Cognition  Overall Cognitive Status: Impaired  Arousal/Alertness: Delayed responses to stimuli  Orientation Level: Oriented X4  Following Commands:  (Follows one step commands impulsively)  Impulsive: Moderately    General Assessments:     Activity Tolerance  Endurance: Tolerates 10 - 20 min exercise with multiple rests  Early Mobility/Exercise Safety Screen: Proceed with mobilization - No exclusion criteria met    Sensation  Light Touch: No apparent deficits    Strength  Strength Comments: B LE >/= 3+/5  Strength  Strength Comments: B LE >/= 3+/5    Static Sitting Balance  Static  Sitting-Balance Support: No upper extremity supported, Feet supported  Static Sitting-Level of Assistance: Contact guard  Dynamic Sitting Balance  Dynamic Sitting-Balance Support: No upper extremity supported, Feet supported  Dynamic Sitting-Level of Assistance: Contact guard    Static Standing Balance  Static Standing-Balance Support: Bilateral upper extremity supported (walker)  Static Standing-Level of Assistance: Minimum assistance (x1 person)  Dynamic Standing Balance  Dynamic Standing-Balance Support: Bilateral upper extremity supported  Dynamic Standing-Level of Assistance: Minimum assistance (x1 person)  Functional Assessments:  Bed Mobility  Bed Mobility: Yes  Bed Mobility 1  Bed Mobility 1: Supine to sitting, Sitting to supine  Level of Assistance 1: Close supervision  Bed Mobility Comments 1: x1 trial; HOB maximally elevated; patient impulsive with command and requires additional cueing. reporting nausea after transfer.    Transfers  Transfer: Yes  Transfer 1  Technique 1: Sit to stand, Stand to sit  Transfer Device 1: Walker  Transfer Level of Assistance 1: Contact guard  Trials/Comments 1: x1 trial; patient required additional cueing for impulsiveness    Ambulation/Gait Training  Ambulation/Gait Training Performed: Yes  Ambulation/Gait Training 1  Surface 1: Level tile  Device 1: Rolling walker  Assistance 1: Minimum assistance (x1 person)  Quality of Gait 1: Wide base of support, Decreased step length, Forward flexed posture  Comments/Distance (ft) 1: ~25ft; patient requiring frequent verbal and tactile cueing to maintain eyes open during ambulation and standing; cueing and assistance needed for walker management  Extremity/Trunk Assessments:  RLE   RLE : Within Functional Limits  LLE   LLE : Within Functional Limits  Outcome Measures:  [facility] Basic Mobility  Turning from your back to your side while in a flat bed without using bedrails: A little  Moving from lying on your back to sitting on the side of  a flat bed without using bedrails: A little  Moving to and from bed to chair (including a wheelchair): A little  Standing up from a chair using your arms (e.g. wheelchair or bedside chair): A little  To walk in hospital room: A little  Climbing 3-5 steps with railing: Total  Basic Mobility - Total Score: 16    Encounter Problems       Encounter Problems (Active)       Balance          Mobility       Patient will ambulate >/= 200ft indep (Progressing)       Start:  06/13/25    Expected End:  06/20/25            Patient will ascend and descend 14 stairs with use of one handrail indep (Progressing)       Start:  06/13/25    Expected End:  06/20/25            Patient will demonstrate adherence and understanding to spinal precautions (Progressing)       Start:  06/13/25    Expected End:  06/20/25               PT Transfers       Patient to transfer to and from sit to supine indep (Progressing)       Start:  06/13/25    Expected End:  06/20/25            Patient will transfer sit to and from stand indep (Progressing)       Start:  06/13/25    Expected End:  06/20/25               Pain - Adult              Education Documentation  Precautions, taught by ROBERTA Obregon at 6/13/2025  5:47 PM.  Learner: Patient  Readiness: Acceptance  Method: Explanation, Demonstration  Response: Needs Reinforcement  Comment: Educated patient on spinal precautions and re-inforced during transfers and ambulation.    Education Comments  No comments found.      GABY Obregon  Completion of this session, clinical decision making, and documentation performed under the supervision/direction of Felicia Lacey DPT.

## 2025-06-13 NOTE — PROGRESS NOTES
ORTHOPAEDIC SURGERY SPINE NOTE    S/p C5-7 ACDF    Post-Operative Plan:   - floor, tower 6  - soft collar, ok to remove for personal hygiene  - ancef x 24 hours  - decadron 10 mg iv q8 x 3 doses  - SCDs, no pharmacologic DVT ppx  - drain x 1  - out of bed as tolerated  - PT/OT eval and treat  - ok for regular diet, advance as tolerated  - Standing upright XR C Spine Ap and lateral    Josias Issa MD  Spine Surgery Fellow

## 2025-06-13 NOTE — H&P
"History Of Present Illness  Esther Nicole \"Bebe\" is a 34 y.o. female presenting with cervical radiculopathy.  She states that her symptoms started in January 2025 where she was having neck pain and pain that radiate down the right arm particularly around the right triceps forearm and periscapular region.  She is also started to develop numbness in her hands more so involving the thumb index and long finger.  She is also noticed that she has been dropping things at work.  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  She spends a lot of time looking down and with this pain she is unable to look down for long peers of time.  Looking down causes sharp pain going down her arm but also down her back.  Otherwise asymptomatic on the left side.  No history of cervical spine surgery.  She did see pain management and had an epidural steroid injection on April 18, 2025 which provided about 3 weeks of relief and then the pain came back.  She is scheduled for physical therapy however she is unable to tolerate it due to so much pain with any range of motion of her neck.  She denies any myelopathic symptoms such as gait instability or frequent falls.     No anticoagulations  She does not smoke tobacco or use nicotine products..     Past Medical History  Medical History[1]    Surgical History  Surgical History[2]     Social History  She reports that she has never smoked. She uses smokeless tobacco. She reports current alcohol use. She reports current drug use. Drug: Marijuana.    Family History  Family History[3]     Allergies  Patient has no known allergies.    Review of Systems     Physical Exam   BMI: 41.95  General: Patient appears well-nourished and well-developed in no acute distress, Alert and Oriented x3  Psych: Pleasant mood and affect  HEENT: Extraocular muscles intact, pupils equal and round. Sclerae anicteric   Cardio: extremities warm and well perfused  Resp: unlabored symmetric breathing  Skin: no open " "wounds or rash  Musculoskeletal/Neuro Exam: Normal gait.  She has some mild difficulty with tandem and heel-to-toe gait.  Diffuse tenderness to palpation along the right paraspinal and parascapular region.  Pain and stiffness with cervical range of motion she holds her head in a slightly flexed position because extending her neck causes severe pain down her arm and back..  Positive Spurlings on the right negative on the left.  Positive Lhermitte's Sign     Upper extremity: Negative Tinel negative [place] over the right carpal tunnel     Motor: Right upper extremity was 4 out of 5 strength with shoulder abduction, elbow flexion and extension, wrist flexion and extension and  against resistance  Left upper extremity with 5 out of 5 strength with shoulder abduction, elbow flexion and extension, wrist flexion and extension and  against resistance     Sensation to light touch intact along C5-T1 distribution bilaterally but diminished in the right hand along the thumb index and long finger.     Reflex: 2+ brachioradialis and biceps bilaterally     Upper Motor Signs: Negative Seth sign bilaterally     Lower extremity     Motor: Right leg with 5 out of 5 motor strength with hip flexion, knee extension, ankle dorsiflexion plantarflexion EHL against resistance  Left leg with 5 out of 5 motor strength with hip flexion, knee extension, ankle dorsiflexion plantarflexion EHL against resistance     Sensation to light touch intact along L2-S1 distribution bilaterally     2+ patella and Achilles Reflex  Last Recorded Vitals  Blood pressure (!) 165/91, pulse 86, temperature 36.6 °C (97.9 °F), temperature source Temporal, resp. rate 16, height 1.549 m (5' 1\"), weight 100 kg (220 lb 14.4 oz), last menstrual period 05/24/2025, SpO2 97%.    Relevant Results        I personally reviewed xray of the cervical spine obtained in clinic today. AP, Lateral, flexion and extension xrays were reviewed. No evidence of acute fracture or " dislocation. There is focal kyphosis at C5-C6 that does not seem to improve with flexion extension.  There is disc space narrowing at C5-C6 C6-C7.     I reviewed the MRI of the cervical spine from March 28, 2025.  MRI shows disc bulge and disc protrusion and extrusion at C5-C6 greater on the left than the right causing moderate central canal stenosis and flatting of the cord.  At C6-C7 there is a right sided disc extrusion causing moderate central canal stenosis and flattening of the cord.      Esther Nicole is a 34 y.o. year old female patient with 6-month history of right radicular arm pain secondary to C5-C6 C6-C7 disc herniation causing moderate canal stenosis as seen on MRI.  She has positive Spurling and positive Lhermitte sign on exam today.  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  She underwent epidural steroid injection which provided only temporary relief.  I do not think that she will be able to tolerate physical therapy due to positive Lhermitte sign and Spurling sign that exacerbates her pain with any movement of her neck.  She holds her neck in a slightly flexed position because of the pain.  Given her physical exam findings with right arm weakness and positive Lhermitte sign and MRI findings for disc herniation with canal stenosis and flatting of the spinal cord I would recommend surgery for C5-C6 C6-C7 anterior cervical discectomy and fusion decompress the neural elements.  I would like for her to get a CT scan of the cervical spine for preoperative planning to make sure that she does not have OPLL or a calcified disc that would prevent me from going anteriorly.  She was given order for this today.     I discussed the risks of surgery which includes but not limited to infection at the surgical site or wound healing requiring additional surgery to clean the infection and long term IV antibiotics. There is risk of blood  loss requiring blood transfusion. Risk of dural tear requiring repair and possible continue cerebral spinal fluid leakage and positional headaches. Risk of Nerve root injury resulting in temporary or permeant weakness, numbness, or radicular pain such as a nerve root palsy. Risk of epidural hematoma and spinal cord compression resulting in weakness in extremity and bowel and bladder disturbances requiring additional surgery to clear out hematoma. Risk of retropharyngeal hematoma causing airway comprise and need for emergent surgery for evacuation. Risk of nonunion or hardware failure requiring additional surgery to correct this. Risk of adjacent level disease in the future requiring additional surgery in the future to address this. Risk of needing to remain intubated after surgery for facial swelling. Risk of dysphagia or difficulty swallowing requiring feeding tube breifly. Risks of developing MI, stroke, DVT, PE during or after surgery. Risk of death. Risk of vascular injury or esophageal injury. Risk of complications from anesthesia requiring prolong intubation. Risk of birgit coronavirus and the complications associated with that while in the hospital.      After our discussion patient elected to proceed with surgery.  Written consent was obtained in clinic today.  She will need preadmission testing.     Surgery will be with Med[employer]onic Zevo plate.  I will be using structural allograft for interbody fusion.  Allograft will be filled with DBM putty.     After our discussion, the patient articulated understanding of the plan and felt that all questions had been answered satisfactorily. The patient was pleased with the visit and very appreciative for the care rendered.     **Please excuse any errors in grammar or translation related to this dictation. Voice recognition software was utilized to prepare this document. **    Angela Collado MD         [1]   Past Medical History:  Diagnosis Date    ADHD  (attention deficit hyperactivity disorder)     Anxiety     Arthritis     Asthma     albuterol prn not used in about 1.5-2 months    Cervical disc disease     GERD (gastroesophageal reflux disease)     Obesity    [2]   Past Surgical History:  Procedure Laterality Date    BUNIONECTOMY Right     LIPOMA RESECTION Right     foot right   [3]   Family History  Problem Relation Name Age of Onset    Other (neck issue) Mother      Other (neck issues) Maternal Grandmother

## 2025-06-13 NOTE — ANESTHESIA POSTPROCEDURE EVALUATION
"Patient: Esther Nicole \"Bebe\"    Procedure Summary       Date: 06/13/25 Room / Location: [facility] OR 07 / [facility] [place] OR    Anesthesia Start: 1007 Anesthesia Stop: 1340    Procedure: DISCECTOMY, SPINE, CERVICAL, ANTERIOR APPROACH, WITH FUSION (Neck) Diagnosis:       Cervical radiculopathy      Connective tissue stenosis of neural canal of cervical region      Herniation of cervical intervertebral disc with radiculopathy      (Cervical radiculopathy [M54.12])      (Connective tissue stenosis of neural canal of cervical region [M99.41])      (Herniation of cervical intervertebral disc with radiculopathy [M50.10])    Surgeons: Angela Collado MD Responsible Provider: Claudia Corral MD    Anesthesia Type: general ASA Status: 3            Anesthesia Type: general    Vitals Value Taken Time   /68 06/13/25 13:41   Temp 38.0 06/13/25 13:41   Pulse 86 06/13/25 13:37   Resp 13 06/13/25 13:37   SpO2 97 % 06/13/25 13:37   Vitals shown include unfiled device data.    Anesthesia Post Evaluation    Patient location during evaluation: PACU  Patient participation: complete - patient participated  Level of consciousness: awake and alert  Pain score: 3  Pain management: satisfactory to patient  Airway patency: patent  Cardiovascular status: acceptable  Respiratory status: acceptable  Hydration status: acceptable  Postoperative Nausea and Vomiting: none        No notable events documented.    "

## 2025-06-13 NOTE — DISCHARGE INSTR - OTHER ORDERS
Wound Care  Neck (Anterior Cervical Spine) Incision  -Change your dressing daily until there is no drainage from your incision site for 24-48 hours.         -The surgical site may be left open to air once drainage has stopped.   -Use a 4x4 gauze pad and paper tape for dressing changes  -If there are steri strips over your incision, do not remove it with your dressing changes.         -The dressing will slowly lift away from the skin over time.   -No lotions, creams, or tub soaks.  -May use heat or ice to posterior neck and shoulders as needed for comfort.      **No NSAIDS (Advil, Ibuprofen, Alevel, Etc.) until your postoperative follow up appointment, they may interfere with the healing of the fusion.        *Resuming NSAID's should be discussed at your follow up appointment.

## 2025-06-13 NOTE — DISCHARGE INSTR - DIET
Resume your normal diet.        **You may need to start off eating softer foods and advance to more regular consistency foods as tolerated.

## 2025-06-14 ENCOUNTER — APPOINTMENT (OUTPATIENT)
Dept: RADIOLOGY | Facility: HOSPITAL | Age: 35
End: 2025-06-14
Payer: COMMERCIAL

## 2025-06-14 ENCOUNTER — DOCUMENTATION (OUTPATIENT)
Dept: HOME HEALTH SERVICES | Facility: HOME HEALTH | Age: 35
End: 2025-06-14
Payer: COMMERCIAL

## 2025-06-14 ENCOUNTER — HOME HEALTH ADMISSION (OUTPATIENT)
Dept: HOME HEALTH SERVICES | Facility: HOME HEALTH | Age: 35
End: 2025-06-14
Payer: COMMERCIAL

## 2025-06-14 ENCOUNTER — PHARMACY VISIT (OUTPATIENT)
Dept: PHARMACY | Facility: CLINIC | Age: 35
End: 2025-06-14
Payer: COMMERCIAL

## 2025-06-14 VITALS
HEART RATE: 73 BPM | OXYGEN SATURATION: 97 % | WEIGHT: 220.9 LBS | BODY MASS INDEX: 41.71 KG/M2 | HEIGHT: 61 IN | TEMPERATURE: 97.3 F | RESPIRATION RATE: 16 BRPM | SYSTOLIC BLOOD PRESSURE: 108 MMHG | DIASTOLIC BLOOD PRESSURE: 62 MMHG

## 2025-06-14 LAB
ANION GAP SERPL CALC-SCNC: 14 MMOL/L (ref 10–20)
BUN SERPL-MCNC: 11 MG/DL (ref 6–23)
CALCIUM SERPL-MCNC: 8.8 MG/DL (ref 8.6–10.6)
CHLORIDE SERPL-SCNC: 104 MMOL/L (ref 98–107)
CO2 SERPL-SCNC: 27 MMOL/L (ref 21–32)
CREAT SERPL-MCNC: 0.74 MG/DL (ref 0.5–1.05)
EGFRCR SERPLBLD CKD-EPI 2021: >90 ML/MIN/1.73M*2
ERYTHROCYTE [DISTWIDTH] IN BLOOD BY AUTOMATED COUNT: 11.6 % (ref 11.5–14.5)
GLUCOSE SERPL-MCNC: 132 MG/DL (ref 74–99)
HCT VFR BLD AUTO: 40.9 % (ref 36–46)
HGB BLD-MCNC: 13.5 G/DL (ref 12–16)
MCH RBC QN AUTO: 32.1 PG (ref 26–34)
MCHC RBC AUTO-ENTMCNC: 33 G/DL (ref 32–36)
MCV RBC AUTO: 97 FL (ref 80–100)
NRBC BLD-RTO: 0 /100 WBCS (ref 0–0)
PLATELET # BLD AUTO: 282 X10*3/UL (ref 150–450)
POTASSIUM SERPL-SCNC: 4.4 MMOL/L (ref 3.5–5.3)
RBC # BLD AUTO: 4.21 X10*6/UL (ref 4–5.2)
SODIUM SERPL-SCNC: 141 MMOL/L (ref 136–145)
WBC # BLD AUTO: 10 X10*3/UL (ref 4.4–11.3)

## 2025-06-14 PROCEDURE — 80048 BASIC METABOLIC PNL TOTAL CA: CPT | Performed by: STUDENT IN AN ORGANIZED HEALTH CARE EDUCATION/TRAINING PROGRAM

## 2025-06-14 PROCEDURE — RXMED WILLOW AMBULATORY MEDICATION CHARGE

## 2025-06-14 PROCEDURE — 96376 TX/PRO/DX INJ SAME DRUG ADON: CPT

## 2025-06-14 PROCEDURE — 72040 X-RAY EXAM NECK SPINE 2-3 VW: CPT | Performed by: RADIOLOGY

## 2025-06-14 PROCEDURE — 97530 THERAPEUTIC ACTIVITIES: CPT | Mod: GP,CQ

## 2025-06-14 PROCEDURE — 2500000004 HC RX 250 GENERAL PHARMACY W/ HCPCS (ALT 636 FOR OP/ED): Performed by: STUDENT IN AN ORGANIZED HEALTH CARE EDUCATION/TRAINING PROGRAM

## 2025-06-14 PROCEDURE — 85027 COMPLETE CBC AUTOMATED: CPT | Performed by: STUDENT IN AN ORGANIZED HEALTH CARE EDUCATION/TRAINING PROGRAM

## 2025-06-14 PROCEDURE — 72040 X-RAY EXAM NECK SPINE 2-3 VW: CPT

## 2025-06-14 PROCEDURE — 36415 COLL VENOUS BLD VENIPUNCTURE: CPT | Performed by: STUDENT IN AN ORGANIZED HEALTH CARE EDUCATION/TRAINING PROGRAM

## 2025-06-14 PROCEDURE — 2500000001 HC RX 250 WO HCPCS SELF ADMINISTERED DRUGS (ALT 637 FOR MEDICARE OP): Performed by: STUDENT IN AN ORGANIZED HEALTH CARE EDUCATION/TRAINING PROGRAM

## 2025-06-14 PROCEDURE — 97116 GAIT TRAINING THERAPY: CPT | Mod: GP,CQ

## 2025-06-14 PROCEDURE — G0378 HOSPITAL OBSERVATION PER HR: HCPCS

## 2025-06-14 PROCEDURE — 97165 OT EVAL LOW COMPLEX 30 MIN: CPT | Mod: GO

## 2025-06-14 RX ORDER — ACETAMINOPHEN 325 MG/1
650 TABLET ORAL EVERY 6 HOURS
Qty: 168 TABLET | Refills: 0 | Status: SHIPPED | OUTPATIENT
Start: 2025-06-14 | End: 2025-07-05

## 2025-06-14 RX ORDER — POLYETHYLENE GLYCOL 3350 17 G/17G
17 POWDER, FOR SOLUTION ORAL 2 TIMES DAILY
Qty: 28 PACKET | Refills: 0 | Status: SHIPPED | OUTPATIENT
Start: 2025-06-14 | End: 2025-06-28

## 2025-06-14 RX ORDER — OXYCODONE HYDROCHLORIDE 5 MG/1
5 TABLET ORAL EVERY 6 HOURS PRN
Qty: 42 TABLET | Refills: 0 | Status: SHIPPED | OUTPATIENT
Start: 2025-06-14

## 2025-06-14 RX ORDER — METHOCARBAMOL 500 MG/1
1000 TABLET, FILM COATED ORAL 3 TIMES DAILY
Qty: 90 TABLET | Refills: 0 | Status: SHIPPED | OUTPATIENT
Start: 2025-06-14 | End: 2025-06-29

## 2025-06-14 RX ORDER — DOCUSATE SODIUM 100 MG/1
100 CAPSULE, LIQUID FILLED ORAL 2 TIMES DAILY
Qty: 60 CAPSULE | Refills: 0 | Status: SHIPPED | OUTPATIENT
Start: 2025-06-14 | End: 2025-07-14

## 2025-06-14 RX ADMIN — ACETAMINOPHEN 975 MG: 325 TABLET ORAL at 01:38

## 2025-06-14 RX ADMIN — GABAPENTIN 300 MG: 300 CAPSULE ORAL at 08:22

## 2025-06-14 RX ADMIN — SENNOSIDES AND DOCUSATE SODIUM 2 TABLET: 50; 8.6 TABLET ORAL at 08:22

## 2025-06-14 RX ADMIN — OXYCODONE 10 MG: 5 TABLET ORAL at 06:33

## 2025-06-14 RX ADMIN — DEXAMETHASONE SODIUM PHOSPHATE 10 MG: 10 INJECTION INTRAMUSCULAR; INTRAVENOUS at 09:44

## 2025-06-14 RX ADMIN — OXYCODONE 10 MG: 5 TABLET ORAL at 10:52

## 2025-06-14 RX ADMIN — METHOCARBAMOL 1000 MG: 500 TABLET ORAL at 08:22

## 2025-06-14 RX ADMIN — OXYCODONE 10 MG: 5 TABLET ORAL at 01:38

## 2025-06-14 RX ADMIN — Medication 50 MCG: at 08:22

## 2025-06-14 RX ADMIN — ACETAMINOPHEN 975 MG: 325 TABLET ORAL at 08:48

## 2025-06-14 ASSESSMENT — COGNITIVE AND FUNCTIONAL STATUS - GENERAL
DAILY ACTIVITIY SCORE: 22
MOBILITY SCORE: 23
DRESSING REGULAR LOWER BODY CLOTHING: A LITTLE
MOVING FROM LYING ON BACK TO SITTING ON SIDE OF FLAT BED WITH BEDRAILS: A LITTLE
HELP NEEDED FOR BATHING: A LITTLE
HELP NEEDED FOR BATHING: A LITTLE
CLIMB 3 TO 5 STEPS WITH RAILING: A LITTLE
DAILY ACTIVITIY SCORE: 23
TURNING FROM BACK TO SIDE WHILE IN FLAT BAD: A LITTLE
MOBILITY SCORE: 22

## 2025-06-14 ASSESSMENT — PAIN DESCRIPTION - DESCRIPTORS
DESCRIPTORS: ACHING

## 2025-06-14 ASSESSMENT — PAIN - FUNCTIONAL ASSESSMENT
PAIN_FUNCTIONAL_ASSESSMENT: 0-10

## 2025-06-14 ASSESSMENT — PAIN SCALES - GENERAL
PAINLEVEL_OUTOF10: 8
PAINLEVEL_OUTOF10: 6
PAINLEVEL_OUTOF10: 8
PAINLEVEL_OUTOF10: 7
PAINLEVEL_OUTOF10: 7
PAINLEVEL_OUTOF10: 8
PAINLEVEL_OUTOF10: 6

## 2025-06-14 ASSESSMENT — ACTIVITIES OF DAILY LIVING (ADL)
ADL_ASSISTANCE: INDEPENDENT
LACK_OF_TRANSPORTATION: NO
BATHING_ASSISTANCE: STAND BY

## 2025-06-14 NOTE — DISCHARGE SUMMARY
Discharge Diagnosis  Connective tissue stenosis of neural canal of cervical region    Issues Requiring Follow-Up  S/p C5-7 ACDF    Test Results Pending At Discharge  Pending Labs       No current pending labs.            Hospital Course   34 year-old female who presented with cervical stenosis. Patient is now s/p C5-7 ACDF on 6/13 by Dr. Collado. On the day of surgery, patient was identified in the pre-operative holding area and agreeable to proceed with surgery. Written consent was obtained.  Please see operative note for further details of this procedure. Patient received 24 hours of jacinta-operative antibiotics. Patient recovered in the PACU before transfer to a regular nursing floor. Patient was started on oxycodone, tylenol, and dilaudid for pain control. Physical therapy recommended continued recovery at home with continued physical therapy and wound care. On the day of discharge, patient was afebrile with stable vital signs. Patient was neurovascularly intact at time of discharge. Patient will follow-up with Dr. Collado in 2 weeks for post-operative visit.      Visit Vitals  /62 (BP Location: Left arm, Patient Position: Lying)   Pulse 73   Temp 36.3 °C (97.3 °F) (Temporal)   Resp 16     Vitals:    06/13/25 0825   Weight: 100 kg (220 lb 14.4 oz)       Immunization History   Administered Date(s) Administered    COVID-19, mRNA, LNP-S, PF, 30 mcg/0.3 mL dose 04/21/2021, 05/21/2021, 01/13/2022       Results        Pertinent Physical Exam At Time of Discharge  Physical Exam  GEN - NAD, resting comfortably in hospital bed  HEENT - MMM, EOMI, NCAT  CV - RRR by peripheral palpation, limbs wwp  PULM - NWOB on RA  ABD - Non-distended  NEURO - JACOBSEN spontaneously, CNs II - XII grossly intact  PSYCH - Appropriate mood and affect     MSK:  Postoperative spine dressings in place without strikethrough bleeding  Drain in place holding suction, serosanginous output     C5: SILT   Deltoid 5/5 Left; 4/5 Right  C6: SILT   Wrist  Ext: 5/5 Left; 5/5 Right  C7: SILT   Triceps: 5/5 Left; 4/5 Right  C8: SILT   Finger flexion: 5/5 Left; 5/5 Right  T1: SILT    Interossei: 5/5 Left; 4/5 Right     L1: SILT       L2: SILT      Hip flexors 5/5 Left; 5/5 Right  L3: SILT      Knee extension 5/5 Left; 5/5 Right  L4: SILT      Tib Ant. (Dorsiflexion) 5/5 Left; 5/5 Right  L5: SILT      EHL 5/5 Left; 5/5 Right  S1: SILT      Plantarflexion 5/5 Left; 5/5 Right    Home Medications     Medication List      START taking these medications     acetaminophen 325 mg tablet; Commonly known as: Tylenol; Take 2 tablets   (650 mg) by mouth every 6 hours for 21 days.   docusate sodium 100 mg capsule; Commonly known as: Colace; Take 1   capsule (100 mg) by mouth 2 times a day.   methocarbamol 500 mg tablet; Commonly known as: Robaxin; Take 2 tablets   (1,000 mg) by mouth 3 times a day for 15 days.   oxyCODONE 5 mg immediate release tablet; Commonly known as: Roxicodone;   Take 1 tablet (5 mg) by mouth every 6 hours if needed for severe pain (7 -   10) (Take 1 tablet orally every 4-6 hours as needed for pain for 7 days.).   polyethylene glycol 17 gram packet; Commonly known as: Glycolax,   Miralax; Take 17 g by mouth 2 times a day for 14 days.     CONTINUE taking these medications     baclofen 5 mg tablet; Commonly known as: Lioresal   * chlorhexidine 4 % external liquid; Commonly known as: Hibiclens; Use   as directed daily preoperatively for 5 days leading up to surgery, wash   body all over not on face or genital region, let sit on skin for 3 minutes   before rising.   * chlorhexidine 0.12 % solution; Commonly known as: Peridex; Swish and   spit with 15ml of solution the night before and morning of surgery. Do not   swallow.   gabapentin 300 mg capsule; Commonly known as: Neurontin; Take 1 capsule   (300 mg) by mouth 2 times a day.   medical cannabis   * norgestimate-ethinyl estradioL 0.18/0.215/0.25 mg-0.025 mg tablet;   Commonly known as: Ortho Tri-Cyclen LO; Take 1  tablet by mouth once daily.   * norgestimate-ethinyl estradioL 0.18/0.215/0.25 mg-0.025 mg tablet;   Commonly known as: Ortho Tri-Cyclen LO; Take 1 tablet by mouth once daily.   Vitamin D3 50 mcg (2,000 units) capsule; Generic drug: cholecalciferol  * This list has 4 medication(s) that are the same as other medications   prescribed for you. Read the directions carefully, and ask your doctor or   other care provider to review them with you.       Outpatient Follow-Up  Future Appointments   Date Time Provider Department Center   7/15/2025 10:00 AM Angela Collado MD [id number] Karl Huggins MD

## 2025-06-14 NOTE — PROGRESS NOTES
"Orthopaedic Surgery Progress Note  Subjective    S:    No events overnight. Pain controlled. Nauseas yesterday. Otherwise no complaints.      Objective    O:  /62 (BP Location: Left arm, Patient Position: Lying)   Pulse 73   Temp 36.3 °C (97.3 °F) (Temporal)   Resp 16   Ht 1.549 m (5' 1\")   Wt 100 kg (220 lb 14.4 oz)   LMP 05/24/2025 (Approximate)   SpO2 97%   BMI 41.74 kg/m²     GEN - NAD, resting comfortably in hospital bed  HEENT - MMM, EOMI, NCAT  CV - RRR by peripheral palpation, limbs wwp  PULM - NWOB on RA  ABD - Non-distended  NEURO - JACOBSEN spontaneously, CNs II - XII grossly intact  PSYCH - Appropriate mood and affect    MSK:  Postoperative spine dressings in place without strikethrough bleeding  Drain in place holding suction, serosanginous output    C5: SILT   Deltoid 5/5 Left; 4/5 Right  C6: SILT   Wrist Ext: 5/5 Left; 5/5 Right  C7: SILT   Triceps: 5/5 Left; 4/5 Right  C8: SILT   Finger flexion: 5/5 Left; 5/5 Right  T1: SILT    Interossei: 5/5 Left; 4/5 Right    L1: SILT       L2: SILT      Hip flexors 5/5 Left; 5/5 Right  L3: SILT      Knee extension 5/5 Left; 5/5 Right  L4: SILT      Tib Ant. (Dorsiflexion) 5/5 Left; 5/5 Right  L5: SILT      EHL 5/5 Left; 5/5 Right  S1: SILT      Plantarflexion 5/5 Left; 5/5 Right      Assessment:  34 y.o. female s/p C5-7 ACDF on 6/14 with Dr. Collado. Symptoms from pre-op RUE weakness.    Plan:   - floor, tower 6  - soft collar, ok to remove for personal hygiene  - ancef x 24 hours  - decadron 10 mg iv q8 x 3 doses  - SCDs, no pharmacologic DVT ppx  - drain x 1; dc'd today  - out of bed as tolerated  - PT/OT eval and treat; recommend [facility]  - ok for regular diet, advance as tolerated  - Standing upright XR C Spine Ap and lateral    Dispo: Home today after [facility] set up    Plan was discussed with Dr. Collado.     Guille Huggins MD, PGY-2  Orthopaedic Surgery  On-call: [phone]  Epic Chat Preferred    This patient will be followed by the Orthopaedic Spine service. " Please page or Epic Chat the corresponding residents below with questions or concerns.     Ortho Spine Service (Epic Chat Preferred)  First call: Guille Huggins MD PGY-2  Second call: Bharathi Rodriguez MD PGY-4    6pm-6am M-F, Holidays, and weekends page Ortho on-call @[phone] with urgent questions/concerns.

## 2025-06-14 NOTE — PROGRESS NOTES
"Occupational Therapy    Evaluation    Patient Name: Esther Nicole \"Bebe\"  MRN: [id number]  Today's Date: 6/14/2025  Room: [address] [place], [place] [address]  Time Calculation  Start Time: 1003  Stop Time: 1013  Time Calculation (min): 10 min  Assessment  IP OT Assessment  OT Assessment: Pt demos the ability to complete ADLs and functional transfers close to baseline at this time (SUP-IND) The pt has appropriate support and home setup to enable safe discharge home. No acute OT need identified. Signing off.  Barriers to Discharge Home: No anticipated barriers  Evaluation/Treatment Tolerance: Patient tolerated treatment well  Medical Staff Made Aware: Yes  End of Session Communication: Bedside nurse  End of Session Patient Position: Up in chair, Alarm off, not on at start of session  Plan:  Inpatient Plan  No Skilled OT: No acute OT goals identified  OT Frequency: OT eval only  OT Discharge Recommendations: No OT needed after discharge, No further acute OT  OT Recommended Transfer Status: Independent  OT - OK to Discharge: Yes  OT Assessment  Barriers to Discharge: None  Evaluation/Treatment Tolerance: Patient tolerated treatment well  Medical Staff Made Aware: Yes  Strengths: Capable of completing ADLs semi/independent, Attitude of self, Support of Caregivers  Subjective   Current Problem:  1. 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 Cervical radiculopathy        3. Herniation of cervical intervertebral disc with radiculopathy        General:  Reason for Referral: Patient presents S/p 06/13 C5-7 ACDF  Past Medical History Relevant to Rehab: None  Prior to Session Communication: Bedside nurse  Patient Position Received: Up in chair, Alarm off, not on " at start of session  Family/Caregiver Present: No  General Comment: Pt very pleasant and agreeable to OT evaluation - reports her R UE functioning much better post op than before surgery   Precautions:  Medical Precautions: Fall precautions  Post-Surgical Precautions: Spinal precautions  Braces Applied: Soft collar in place upon arrival  Pain:  Pain Assessment  Pain Assessment: 0-10  0-10 (Numeric) Pain Score: 8  Pain Type: Surgical pain  Pain Location: Neck  Pain Interventions: Repositioned  Response to Interventions: Content/relaxed  Lines/Tubes/Drains:  Closed/Suction Drain Anterior Neck Bulb 15 Fr. (Active)   Number of days: 1   Objective   Cognition:  Overall Cognitive Status: Within Functional Limits  Arousal/Alertness: Appropriate responses to stimuli  Orientation Level: Oriented X4  Following Commands: Follows all commands and directions without difficulty   Home Living:  Type of Home: Condo  Lives With: Significant other (boyfrienraul Landry)  Home Adaptive Equipment: None  Home Layout: Two level, Bed/bath upstairs, Stairs to alternate level with rails  Alternate Level Stairs-Rails: Right  Alternate Level Stairs-Number of Steps: 14  Home Access: Level entry  Bathroom Shower/Tub: Tub/shower unit  Bathroom Toilet: Standard  Bathroom Equipment: None, Hand-held shower hose   Prior Function:  Level of [place]: Independent with ADLs and functional transfers, Independent with homemaking with ambulation  ADL Assistance: Independent  Homemaking Assistance: Independent  Ambulatory Assistance: Independent  Vocational: Full time employment  Leisure: Bike, color, camping  Hand Dominance: Right  Prior Function Comments: - falls  IADL History:  Homemaking Responsibilities: Yes  Meal Prep Responsibility: Primary  Laundry Responsibility: Primary  Cleaning Responsibility: Primary  Bill Paying/Finance Responsibility: Primary  Shopping Responsibility: Primary  Current License: Yes  Mode of Transportation: Car  Occupation:  Full time employment  Type of Occupation: Dental office  Leisure and Hobbies: Bike, color, camping  ADL:  Eating Assistance: Independent  Grooming Assistance: Independent (Anticipated)  Bathing Assistance: Stand by (Anticipated)  Bathing Deficit: Supervision/safety  UE Dressing Assistance: Independent (Anticipated)  LE Dressing Assistance: Independent  Toileting Assistance with Device: Independent  ADL Comments: No concerns  Activity Tolerance:  Endurance: Endurance does not limit participation in activity  Balance:  Dynamic Standing Balance  Dynamic Standing-Balance Support: No upper extremity supported  Dynamic Standing-Level of Assistance: Independent  Static Sitting Balance  Static Sitting-Balance Support: No upper extremity supported, Feet supported  Static Sitting-Level of Assistance: Independent  Static Standing Balance  Static Standing-Balance Support: No upper extremity supported  Static Standing-Level of Assistance: Independent  Bed Mobility/Transfers: Functional Mobility  Functional Mobility Performed: Yes  Functional Mobility 1  Surface 1: Level tile  Device 1: No device  Assistance 1: Distant supervision  Comments 1: Within room to bathroom and back - no concerns   and Transfers  Transfer: Yes  Transfer 1  Transfer From 1: Sit to, Stand to  Transfer to 1: Stand, Sit  Technique 1: Sit to stand, Stand to sit  Transfer Level of Assistance 1: Independent  IADL's:   Homemaking Responsibilities: Yes  Meal Prep Responsibility: Primary  Laundry Responsibility: Primary  Cleaning Responsibility: Primary  Bill Paying/Finance Responsibility: Primary  Shopping Responsibility: Primary  Current License: Yes  Mode of Transportation: Car  Occupation: Full time employment  Type of Occupation: Dental office  Leisure and Hobbies: Bike, color, camping  Vision: Vision - Basic Assessment  Current Vision: No visual deficits   and    Sensation:  Light Touch: No apparent deficits  Strength:  Strength Comments: YANN Castillo  WFL  Perception:  Inattention/Neglect: Appears intact  Coordination:  Movements are Fluid and Coordinated: Yes   Hand Function:  Hand Function  Gross Grasp: Functional  Coordination: Functional  Extremities: RUE   RUE : Within Functional Limits, LUE   LUE: Within Functional Limits,  , and      Outcome Measures: [facility] Daily Activity  Putting on and taking off regular lower body clothing: None  Bathing (including washing, rinsing, drying): A little  Putting on and taking off regular upper body clothing: None  Toileting, which includes using toilet, bedpan or urinal: None  Taking care of personal grooming such as brushing teeth: None  Eating Meals: None  Daily Activity - Total Score: 23         ,     OT Adult Other Outcome Measures  4AT: 4 AT -    Education Documentation  Body Mechanics, taught by Diogo Logan OT at 6/14/2025  1:47 PM.  Learner: Patient  Readiness: Acceptance  Method: Explanation, Demonstration  Response: Verbalizes Understanding, Demonstrated Understanding  Comment: ADLs and safety    Precautions, taught by Diogo Logan OT at 6/14/2025  1:47 PM.  Learner: Patient  Readiness: Acceptance  Method: Explanation, Demonstration  Response: Verbalizes Understanding, Demonstrated Understanding  Comment: ADLs and safety    ADL Training, taught by Diogo Logan OT at 6/14/2025  1:47 PM.  Learner: Patient  Readiness: Acceptance  Method: Explanation, Demonstration  Response: Verbalizes Understanding, Demonstrated Understanding  Comment: ADLs and safety    Education Comments  No comments found.        06/14/25 at 1:48 PM   Diogo Logan OT   Rehab Office: [phone]

## 2025-06-14 NOTE — HH CARE COORDINATION
Home Care received a Referral for Physical Therapy and Occupational Therapy. We have processed the referral for a Start of Care on 6/16/2025 .     If you have any questions or concerns, please feel free to contact us at [phone]. Follow the prompts, enter your five digit zip code, and you will be directed to your care team on EAST 1.

## 2025-06-14 NOTE — CARE PLAN
The patient's goals for the shift include pt will have manageable pain level    The clinical goals for the shift include pt will remain safe and utilize call light

## 2025-06-14 NOTE — PROGRESS NOTES
"Physical Therapy    Physical Therapy Treatment    Patient Name: Esther Nicole \"Bebe\"  MRN: [id number]  Department: [place] [address]  Room: [address] [place] [address]  Today's Date: 6/14/2025  Time Calculation  Start Time: 0855  Stop Time: 0918  Time Calculation (min): 23 min         Assessment/Plan   PT Assessment  PT Assessment Results: Pain  End of Session Communication: Bedside nurse  Assessment Comment: Pt progressed ambulation to 500' using no AD with independence. Pt able to ascend/descend 16 stairs using 1 HR with mod I. Pt independent with all functional transfers. Pt unable to recal spinal precations and requires cuing throughout treatment to maintain precautions. Pt remains appropriate for low intensity therapy upon dc.  End of Session Patient Position: Up in chair, Alarm off, not on at start of session     PT Plan  Treatment/Interventions: Bed mobility, Transfer training, Gait training, Stair training, Balance training, Neuromuscular re-education, Strengthening, Endurance training, Range of motion, Therapeutic exercise, Therapeutic activity, Home exercise program, Postural re-education  PT Plan: Ongoing PT  PT Frequency: 5 times per week  PT Discharge Recommendations: Low intensity level of continued care  Equipment Recommended upon Discharge: Other (comment) (did not use AD today)  PT Recommended Transfer Status: Assist x1  PT - OK to Discharge: Yes    PT Visit Info:  PT Received On: 06/14/25     General Visit Information:   General  Prior to Session Communication: Bedside nurse  Patient Position Received: Bed, 3 rail up, Alarm off, not on at start of session  General Comment: Pt supine in bed upon arrival. Pt pleasant and agreeable to therapy.    Subjective   Precautions:  Precautions  Medical Precautions: Fall precautions, Spinal precautions  Precautions Comment: Pt unable to recal spinal precatuions.            Objective   Pain:  Pain Assessment  Pain Assessment: 0-10  0-10 (Numeric) Pain Score: 8  Pain Type: Surgical " pain  Pain Location: Neck  Pain Descriptors: Aching  Pain Frequency: Intermittent  Pain Interventions: Rest  Response to Interventions: Decrease in pain  Cognition:  Cognition  Overall Cognitive Status: Within Functional Limits    Activity Tolerance:  Activity Tolerance  Endurance: Endurance does not limit participation in activity  Treatments:  Therapeutic Exercise  Therapeutic Exercise Performed: Yes  Therapeutic Exercise Activity 1: Pt provided handout of general exercises supine and sitting and educated in proper exercise execution.    Therapeutic Activity  Therapeutic Activity Performed: Yes  Therapeutic Activity 1: Time spent educating pt in spinal precautions and use of log roll.    Bed Mobility  Bed Mobility: Yes  Bed Mobility 1  Bed Mobility 1: Supine to sitting  Level of Assistance 1: Close supervision  Bed Mobility Comments 1: increased cuing for log roll    Ambulation/Gait Training  Ambulation/Gait Training Performed: Yes  Ambulation/Gait Training 1  Surface 1: Level tile  Device 1: No device  Assistance 1: Independent  Comments/Distance (ft) 1: 500'  Transfers  Transfer: Yes  Transfer 1  Transfer From 1: Sit to, Stand to  Transfer to 1: Stand, Sit  Technique 1: Sit to stand, Stand to sit  Transfer Device 1:  (no AD)  Transfer Level of Assistance 1: Independent    Stairs  Stairs: Yes  Stairs  Rails 1: Left  Curb Step 1: No  Device 1: Railing  Assistance 1: Modified independent  Comment/Number of Steps 1: 16    Outcome Measures:  [facility] Basic Mobility  Turning from your back to your side while in a flat bed without using bedrails: A little  Moving from lying on your back to sitting on the side of a flat bed without using bedrails: A little  Moving to and from bed to chair (including a wheelchair): None  Standing up from a chair using your arms (e.g. wheelchair or bedside chair): None  To walk in hospital room: None  Climbing 3-5 steps with railing: None  Basic Mobility - Total Score: 22    Education  Documentation  Precautions, taught by Velma Anne PTA at 6/14/2025 10:42 AM.  Learner: Patient  Readiness: Acceptance  Method: Explanation  Response: Verbalizes Understanding, Needs Reinforcement  Comment: Pt educated in spinal precautions and use of log roll to maintain precautions.    Education Comments  No comments found.        OP EDUCATION:       Encounter Problems       Encounter Problems (Active)       Balance          Mobility       Patient will ambulate >/= 200ft indep (Progressing)       Start:  06/13/25    Expected End:  06/20/25            Patient will ascend and descend 14 stairs with use of one handrail indep (Progressing)       Start:  06/13/25    Expected End:  06/20/25            Patient will demonstrate adherence and understanding to spinal precautions (Progressing)       Start:  06/13/25    Expected End:  06/20/25               PT Transfers       Patient to transfer to and from sit to supine indep (Progressing)       Start:  06/13/25    Expected End:  06/20/25            Patient will transfer sit to and from stand indep (Progressing)       Start:  06/13/25    Expected End:  06/20/25               Pain - Adult

## 2025-06-14 NOTE — PROGRESS NOTES
06/14/25 1100   Discharge Planning   Living Arrangements Spouse/significant other   Support Systems Spouse/significant other   Assistance Needed None needed   Type of Residence Private residence   Number of Stairs to Enter Residence 1   Number of Stairs Within Residence 14   Expected Discharge Disposition Home H   Does the patient need discharge transport arranged? No   Financial Resource Strain   How hard is it for you to pay for the very basics like food, housing, medical care, and heating? Not hard   Housing Stability   In the last 12 months, was there a time when you were not able to pay the mortgage or rent on time? N   At any time in the past 12 months, were you homeless or living in a shelter (including now)? N   Transportation Needs   In the past 12 months, has lack of transportation kept you from medical appointments or from getting medications? no   In the past 12 months, has lack of transportation kept you from meetings, work, or from getting things needed for daily living? No   Patient Choice   Provider Choice list and CMS website (https://medicare.gov/care-compare#search) for post-acute Quality and Resource Measure Data were provided and reviewed with: Patient     Spoke with pt and introduced myself as Care Coordinator with Care Transitions Team for Discharge Planning. Pt stated she feels safe at home. Pt drives [place].  Pt's address, phone number and contact information was verified. Pt denies any social or financial concerns at this time.  DME: knee scooter  Patient notified of PT/OT recommendation for low intensity therapy. Freedom of choice explained and HC list provided.     Pt has no agency of choice agreeable to [facility].     Addendum: [facility] nurse confirmed a SOC for 6-16.  Patient, MD and bedside nurse notified.     Arpita Aguilar, RN, BSN  Transitional Care Coordinator  Office: [phone]  Secure chat via [employer]iku

## 2025-06-16 ENCOUNTER — HOME CARE VISIT (OUTPATIENT)
Dept: HOME HEALTH SERVICES | Facility: HOME HEALTH | Age: 35
End: 2025-06-16
Payer: COMMERCIAL

## 2025-06-16 VITALS
DIASTOLIC BLOOD PRESSURE: 62 MMHG | OXYGEN SATURATION: 94 % | SYSTOLIC BLOOD PRESSURE: 115 MMHG | RESPIRATION RATE: 18 BRPM | HEART RATE: 86 BPM | TEMPERATURE: 98.4 F

## 2025-06-16 PROCEDURE — 0023 HH SOC

## 2025-06-16 PROCEDURE — G0151 HHCP-SERV OF PT,EA 15 MIN: HCPCS

## 2025-06-16 SDOH — HEALTH STABILITY: PHYSICAL HEALTH: EXERCISE ACTIVITY: CALF RAISES, HIP FLEX/EXT/ABD, HS CURL, MARCHING, MINI SQUATS

## 2025-06-16 SDOH — HEALTH STABILITY: PHYSICAL HEALTH: PHYSICAL EXERCISE: 10

## 2025-06-16 SDOH — HEALTH STABILITY: PHYSICAL HEALTH

## 2025-06-16 SDOH — HEALTH STABILITY: PHYSICAL HEALTH: EXERCISE ACTIVITIES SETS: 1

## 2025-06-16 SDOH — HEALTH STABILITY: PHYSICAL HEALTH: PHYSICAL EXERCISE: STANDING

## 2025-06-16 ASSESSMENT — ENCOUNTER SYMPTOMS
PAIN LOCATION - RELIEVING FACTORS: REST, ICE, MEDS
PAIN LOCATION: NECK
PERSON REPORTING PAIN: PATIENT
LOWEST PAIN SEVERITY IN PAST 24 HOURS: 4/10
PAIN: 1
MUSCLE WEAKNESS: 1
SUBJECTIVE PAIN PROGRESSION: GRADUALLY IMPROVING
PAIN LOCATION - PAIN SEVERITY: 8/10
PAIN LOCATION - EXACERBATING FACTORS: MOVEMENT
HIGHEST PAIN SEVERITY IN PAST 24 HOURS: 8/10

## 2025-06-16 ASSESSMENT — ACTIVITIES OF DAILY LIVING (ADL)
AMBULATION_DISTANCE/DURATION_TOLERATED: 150 FT
OASIS_M1830: 03
PHYSICAL TRANSFERS ASSESSED: 1
AMBULATION ASSISTANCE: 1
AMBULATION ASSISTANCE: SUPERVISION
AMBULATION ASSISTANCE ON FLAT SURFACES: 1
CURRENT_FUNCTION: SUPERVISION
ENTERING_EXITING_HOME: SUPERVISION

## 2025-06-17 ENCOUNTER — HOME CARE VISIT (OUTPATIENT)
Dept: HOME HEALTH SERVICES | Facility: HOME HEALTH | Age: 35
End: 2025-06-17
Payer: COMMERCIAL

## 2025-06-17 PROCEDURE — G0152 HHCP-SERV OF OT,EA 15 MIN: HCPCS

## 2025-06-18 ENCOUNTER — HOME CARE VISIT (OUTPATIENT)
Dept: HOME HEALTH SERVICES | Facility: HOME HEALTH | Age: 35
End: 2025-06-18
Payer: COMMERCIAL

## 2025-06-18 ASSESSMENT — ACTIVITIES OF DAILY LIVING (ADL)
DRESSING_UB_CURRENT_FUNCTION: INDEPENDENT
BATHING ASSESSED: 1
BATHING_CURRENT_FUNCTION: SUPERVISION
GROOMING_CURRENT_FUNCTION: INDEPENDENT
TOILETING: 1
OASIS_M1830: 02
HOME_HEALTH_OASIS: 01
GROOMING ASSESSED: 1
DRESSING_LB_CURRENT_FUNCTION: INDEPENDENT
TOILETING: INDEPENDENT

## 2025-06-18 ASSESSMENT — ENCOUNTER SYMPTOMS
PERSON REPORTING PAIN: PATIENT
PAIN SEVERITY GOAL: 0/10
PAIN: 1
HIGHEST PAIN SEVERITY IN PAST 24 HOURS: 8/10
PAIN LOCATION: RIGHT ARM
PAIN LOCATION - PAIN FREQUENCY: INTERMITTENT
PAIN LOCATION - PAIN SEVERITY: 4/10
PAIN LOCATION - RELIEVING FACTORS: MEDS
PAIN LOCATION - PAIN QUALITY: ACHING
LOWEST PAIN SEVERITY IN PAST 24 HOURS: 4/10

## 2025-07-15 ENCOUNTER — HOSPITAL ENCOUNTER (OUTPATIENT)
Dept: RADIOLOGY | Facility: CLINIC | Age: 35
Discharge: HOME | End: 2025-07-15
Payer: COMMERCIAL

## 2025-07-15 ENCOUNTER — OFFICE VISIT (OUTPATIENT)
Dept: ORTHOPEDIC SURGERY | Facility: CLINIC | Age: 35
End: 2025-07-15
Payer: COMMERCIAL

## 2025-07-15 VITALS — HEIGHT: 61 IN | BODY MASS INDEX: 41.54 KG/M2 | WEIGHT: 220 LBS

## 2025-07-15 DIAGNOSIS — M99.41 CONNECTIVE TISSUE STENOSIS OF NEURAL CANAL OF CERVICAL REGION: ICD-10-CM

## 2025-07-15 DIAGNOSIS — M50.10 HERNIATION OF CERVICAL INTERVERTEBRAL DISC WITH RADICULOPATHY: ICD-10-CM

## 2025-07-15 DIAGNOSIS — Z98.1 S/P CERVICAL SPINAL FUSION: Primary | ICD-10-CM

## 2025-07-15 PROCEDURE — 72040 X-RAY EXAM NECK SPINE 2-3 VW: CPT | Performed by: RADIOLOGY

## 2025-07-15 PROCEDURE — 72040 X-RAY EXAM NECK SPINE 2-3 VW: CPT

## 2025-07-15 PROCEDURE — 99024 POSTOP FOLLOW-UP VISIT: CPT | Performed by: ORTHOPAEDIC SURGERY

## 2025-07-15 PROCEDURE — 99212 OFFICE O/P EST SF 10 MIN: CPT | Performed by: ORTHOPAEDIC SURGERY

## 2025-07-15 PROCEDURE — 3008F BODY MASS INDEX DOCD: CPT | Performed by: ORTHOPAEDIC SURGERY

## 2025-07-15 ASSESSMENT — COLUMBIA-SUICIDE SEVERITY RATING SCALE - C-SSRS
1. IN THE PAST MONTH, HAVE YOU WISHED YOU WERE DEAD OR WISHED YOU COULD GO TO SLEEP AND NOT WAKE UP?: NO
2. HAVE YOU ACTUALLY HAD ANY THOUGHTS OF KILLING YOURSELF?: NO
6. HAVE YOU EVER DONE ANYTHING, STARTED TO DO ANYTHING, OR PREPARED TO DO ANYTHING TO END YOUR LIFE?: NO

## 2025-07-15 ASSESSMENT — PAIN SCALES - GENERAL
PAINLEVEL_OUTOF10: 4
PAINLEVEL_OUTOF10: 4

## 2025-07-15 ASSESSMENT — ENCOUNTER SYMPTOMS
LOSS OF SENSATION IN FEET: 0
OCCASIONAL FEELINGS OF UNSTEADINESS: 0
DEPRESSION: 0

## 2025-07-15 ASSESSMENT — PAIN DESCRIPTION - DESCRIPTORS: DESCRIPTORS: ACHING;STABBING

## 2025-07-15 ASSESSMENT — PAIN - FUNCTIONAL ASSESSMENT: PAIN_FUNCTIONAL_ASSESSMENT: 0-10

## 2025-07-15 NOTE — LETTER
July 15, 2025     Viri Mazariegos PA-C  [address]  [place] [address]    Patient: Bebe Nicole   YOB: 1990   Date of Visit: 7/15/2025       Dear Dr. Viri Mazariegos PA-C:    Thank you for referring Bebe Nicole to me for evaluation. Below are my notes for this consultation.  If you have questions, please do not hesitate to call me. I look forward to following your patient along with you.       Sincerely,     Angela Collado MD      CC: No Recipients  ______________________________________________________________________________________    S: Esther Nicole is a 34 y.o. year old female patient who is about 4 weeks out status post C5-C6 C6-C7 anterior cervical discectomy and fusion on June 13, 2025 for cervical disc herniation causing stenosis and cervical radiculopathy and weakness.  Patient tolerated surgery well and was discharged home without any complication.  This is her first postop visit.  She is doing great.  The radicular arm pain and weakness on the right side completely resolved.  She has no neck pain.  No dysphagia.  She is off all her pain medications.  She still on gabapentin which she wants to titrate off of.      O:     General: Patient appears well-nourished and well-developed in no acute distress, Alert and Oriented x3  Psych: Pleasant mood and affect  HEENT: Extraocular muscles intact, pupils equal and round. Sclerae anicteric   Cardio: extremities warm and well perfused  Resp: unlabored symmetric breathing  Skin: Anterior cervical incision healing nicely  Musculoskeletal/Neuro Exam: Normal gait.  Some mild stiffness with cervical range of motion but no pain      Upper extremity:    Motor: Right upper extremity was 5 out of 5 strength with shoulder abduction, elbow flexion and extension, wrist flexion and extension and  against resistance  Left upper extremity with 5 out of 5 strength with shoulder abduction, elbow flexion and extension, wrist flexion  and extension and  against resistance    Sensation to light touch intact along C5-T1 distribution bilaterally        Lower extremity    Motor: Right leg with 5 out of 5 motor strength with hip flexion, knee extension, ankle dorsiflexion plantarflexion EHL against resistance  Left leg with 5 out of 5 motor strength with hip flexion, knee extension, ankle dorsiflexion plantarflexion EHL against resistance    Sensation to light touch intact along L2-S1 bilaterally          Imaging:    Reviewed x-rays obtained in clinic today.  AP lateral views of the cervical spine.  Anterior hardware from C5-C7 intact no signs of hardware loosening.  Interbody graft in place.  She has straightening of the cervical spine which is improvement from preop where she had kyphosis          A/P: Esther Nicole is a 34 y.o. year old female patient who is 4 weeks out status post C5-C6 C6-C7 anterior cervical discectomy and fusion on June 13, 2025 for cervical stenosis and radiculopathy from disc herniation.  She is doing great.  Radicular arm pain and numbness and weakness resolved.  Incisions healing nicely.  X-rays look stable.  At this point she can discontinue the collar.  Continue restrictions of no lifting pushing pulling more than 10 pounds for the next 8 weeks.  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  She can return to her work with restrictions on August 4.  She will follow-up in 4 weeks        At the next visit she will need standing upright AP lateral flexion-extension views of the cervical spine    After our discussion, the patient articulated understanding of the plan and felt that all questions had been answered satisfactorily. The patient was pleased with the visit and very appreciative for the care rendered.    **Please excuse any errors in grammar or translation related to this dictation. Voice recognition software was utilized to prepare this document. **                  Angela Collado,  MD    Department of Orthopaedic Surgery  [facility]  [email or web address]

## 2025-07-15 NOTE — PROGRESS NOTES
S: Esther Nicole is a 34 y.o. year old female patient who is about 4 weeks out status post C5-C6 C6-C7 anterior cervical discectomy and fusion on June 13, 2025 for cervical disc herniation causing stenosis and cervical radiculopathy and weakness.  Patient tolerated surgery well and was discharged home without any complication.  This is her first postop visit.  She is doing great.  The radicular arm pain and weakness on the right side completely resolved.  She has no neck pain.  No dysphagia.  She is off all her pain medications.  She still on gabapentin which she wants to titrate off of.      O:     General: Patient appears well-nourished and well-developed in no acute distress, Alert and Oriented x3  Psych: Pleasant mood and affect  HEENT: Extraocular muscles intact, pupils equal and round. Sclerae anicteric   Cardio: extremities warm and well perfused  Resp: unlabored symmetric breathing  Skin: Anterior cervical incision healing nicely  Musculoskeletal/Neuro Exam: Normal gait.  Some mild stiffness with cervical range of motion but no pain      Upper extremity:    Motor: Right upper extremity was 5 out of 5 strength with shoulder abduction, elbow flexion and extension, wrist flexion and extension and  against resistance  Left upper extremity with 5 out of 5 strength with shoulder abduction, elbow flexion and extension, wrist flexion and extension and  against resistance    Sensation to light touch intact along C5-T1 distribution bilaterally        Lower extremity    Motor: Right leg with 5 out of 5 motor strength with hip flexion, knee extension, ankle dorsiflexion plantarflexion EHL against resistance  Left leg with 5 out of 5 motor strength with hip flexion, knee extension, ankle dorsiflexion plantarflexion EHL against resistance    Sensation to light touch intact along L2-S1 bilaterally          Imaging:    Reviewed x-rays obtained in clinic today.  AP lateral views of the cervical spine.  Anterior  hardware from C5-C7 intact no signs of hardware loosening.  Interbody graft in place.  She has straightening of the cervical spine which is improvement from preop where she had kyphosis          A/P: Esther Nicole is a 34 y.o. year old female patient who is 4 weeks out status post C5-C6 C6-C7 anterior cervical discectomy and fusion on June 13, 2025 for cervical stenosis and radiculopathy from disc herniation.  She is doing great.  Radicular arm pain and numbness and weakness resolved.  Incisions healing nicely.  X-rays look stable.  At this point she can discontinue the collar.  Continue restrictions of no lifting pushing pulling more than 10 pounds for the next 8 weeks.  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  She can return to her work with restrictions on August 4.  She will follow-up in 4 weeks        At the next visit she will need standing upright AP lateral flexion-extension views of the cervical spine    After our discussion, the patient articulated understanding of the plan and felt that all questions had been answered satisfactorily. The patient was pleased with the visit and very appreciative for the care rendered.    **Please excuse any errors in grammar or translation related to this dictation. Voice recognition software was utilized to prepare this document. **                  Angela Collado MD    Department of Orthopaedic Surgery  [facility]  [email or web address]

## 2025-07-15 NOTE — LETTER
July 15, 2025     Patient: Esther Nicole   YOB: 1990   Date of Visit: 7/15/2025       To Whom It May Concern:    It is my medical opinion that Esther Nicole may return to work on 8/4/25 with light duty. No lifting pushing pulling more than 10 lbs. Please also her to take breaks throughout the day.    If you have any questions or concerns, please don't hesitate to call.         Sincerely,        Angela Collado MD    CC: No Recipients

## 2025-08-11 ENCOUNTER — OFFICE VISIT (OUTPATIENT)
Dept: URGENT CARE | Age: 35
End: 2025-08-11
Payer: COMMERCIAL

## 2025-08-11 VITALS
OXYGEN SATURATION: 97 % | HEIGHT: 61 IN | DIASTOLIC BLOOD PRESSURE: 90 MMHG | BODY MASS INDEX: 39.65 KG/M2 | RESPIRATION RATE: 20 BRPM | SYSTOLIC BLOOD PRESSURE: 133 MMHG | HEART RATE: 78 BPM | WEIGHT: 210 LBS | TEMPERATURE: 98 F

## 2025-08-11 DIAGNOSIS — H93.8X1: ICD-10-CM

## 2025-08-11 DIAGNOSIS — R68.89 FLU-LIKE SYMPTOMS: Primary | ICD-10-CM

## 2025-08-11 LAB
POC CORONAVIRUS SARS-COV-2 PCR: NEGATIVE
POC HUMAN RHINOVIRUS PCR: POSITIVE
POC INFLUENZA A VIRUS PCR: NEGATIVE
POC INFLUENZA B VIRUS PCR: NEGATIVE
POC RESPIRATORY SYNCYTIAL VIRUS PCR: NEGATIVE

## 2025-08-11 PROCEDURE — 3008F BODY MASS INDEX DOCD: CPT

## 2025-08-11 PROCEDURE — 99213 OFFICE O/P EST LOW 20 MIN: CPT

## 2025-08-11 PROCEDURE — 87631 RESP VIRUS 3-5 TARGETS: CPT

## 2025-08-19 ENCOUNTER — HOSPITAL ENCOUNTER (OUTPATIENT)
Dept: RADIOLOGY | Facility: CLINIC | Age: 35
Discharge: HOME | End: 2025-08-19
Payer: COMMERCIAL

## 2025-08-19 ENCOUNTER — OFFICE VISIT (OUTPATIENT)
Dept: ORTHOPEDIC SURGERY | Facility: CLINIC | Age: 35
End: 2025-08-19
Payer: COMMERCIAL

## 2025-08-19 DIAGNOSIS — M54.12 CERVICAL RADICULOPATHY: ICD-10-CM

## 2025-08-19 DIAGNOSIS — Z98.1 S/P CERVICAL SPINAL FUSION: Primary | ICD-10-CM

## 2025-08-19 PROCEDURE — 99024 POSTOP FOLLOW-UP VISIT: CPT | Performed by: ORTHOPAEDIC SURGERY

## 2025-08-19 PROCEDURE — 99212 OFFICE O/P EST SF 10 MIN: CPT | Performed by: ORTHOPAEDIC SURGERY

## 2025-08-19 PROCEDURE — 72040 X-RAY EXAM NECK SPINE 2-3 VW: CPT

## 2025-08-19 RX ORDER — METHOCARBAMOL 500 MG/1
500 TABLET, FILM COATED ORAL 3 TIMES DAILY PRN
Qty: 90 TABLET | Refills: 0 | Status: SHIPPED | OUTPATIENT
Start: 2025-08-19 | End: 2025-09-18

## 2025-08-19 ASSESSMENT — PATIENT HEALTH QUESTIONNAIRE - PHQ9
SUM OF ALL RESPONSES TO PHQ QUESTIONS 1-9: 6
8. MOVING OR SPEAKING SO SLOWLY THAT OTHER PEOPLE COULD HAVE NOTICED. OR THE OPPOSITE, BEING SO FIGETY OR RESTLESS THAT YOU HAVE BEEN MOVING AROUND A LOT MORE THAN USUAL: NOT AT ALL
2. FEELING DOWN, DEPRESSED OR HOPELESS: SEVERAL DAYS
4. FEELING TIRED OR HAVING LITTLE ENERGY: SEVERAL DAYS
6. FEELING BAD ABOUT YOURSELF - OR THAT YOU ARE A FAILURE OR HAVE LET YOURSELF OR YOUR FAMILY DOWN: SEVERAL DAYS
7. TROUBLE CONCENTRATING ON THINGS, SUCH AS READING THE NEWSPAPER OR WATCHING TELEVISION: NOT AT ALL
SUM OF ALL RESPONSES TO PHQ9 QUESTIONS 1 AND 2: 2
1. LITTLE INTEREST OR PLEASURE IN DOING THINGS: SEVERAL DAYS
3. TROUBLE FALLING OR STAYING ASLEEP OR SLEEPING TOO MUCH: SEVERAL DAYS
9. THOUGHTS THAT YOU WOULD BE BETTER OFF DEAD, OR OF HURTING YOURSELF: NOT AT ALL
5. POOR APPETITE OR OVEREATING: SEVERAL DAYS

## 2025-08-19 ASSESSMENT — ENCOUNTER SYMPTOMS
LOSS OF SENSATION IN FEET: 0
OCCASIONAL FEELINGS OF UNSTEADINESS: 1
DEPRESSION: 0

## 2025-08-19 ASSESSMENT — COLUMBIA-SUICIDE SEVERITY RATING SCALE - C-SSRS
2. HAVE YOU ACTUALLY HAD ANY THOUGHTS OF KILLING YOURSELF?: NO
6. HAVE YOU EVER DONE ANYTHING, STARTED TO DO ANYTHING, OR PREPARED TO DO ANYTHING TO END YOUR LIFE?: NO
1. IN THE PAST MONTH, HAVE YOU WISHED YOU WERE DEAD OR WISHED YOU COULD GO TO SLEEP AND NOT WAKE UP?: NO

## 2025-08-19 ASSESSMENT — PAIN DESCRIPTION - DESCRIPTORS: DESCRIPTORS: ACHING

## 2025-08-19 ASSESSMENT — PAIN - FUNCTIONAL ASSESSMENT: PAIN_FUNCTIONAL_ASSESSMENT: 0-10

## 2025-08-19 ASSESSMENT — PAIN SCALES - GENERAL: PAINLEVEL_OUTOF10: 6

## 2025-09-05 ENCOUNTER — EVALUATION (OUTPATIENT)
Dept: PHYSICAL THERAPY | Facility: CLINIC | Age: 35
End: 2025-09-05
Payer: COMMERCIAL

## 2025-09-05 DIAGNOSIS — M43.22 FUSION OF SPINE OF CERVICAL REGION: Primary | ICD-10-CM

## 2025-09-05 PROCEDURE — 97110 THERAPEUTIC EXERCISES: CPT | Mod: GP

## 2025-09-05 PROCEDURE — 97162 PT EVAL MOD COMPLEX 30 MIN: CPT | Mod: GP

## 2025-09-05 SDOH — ECONOMIC STABILITY: GENERAL: QUALITY OF LIFE: GOOD

## 2025-09-05 ASSESSMENT — ENCOUNTER SYMPTOMS
QUALITY: DISCOMFORT
DEPRESSION: 0
EXACERBATED BY: OVERHEAD ACTIVITY
EXACERBATED BY: LIFTING
PAIN SCALE: 5
QUALITY: RADIATING
QUALITY: TIGHT
ALLEVIATING FACTORS: SUPPORT
ALLEVIATING FACTORS: MEDICATIONS
PAIN SCALE AT LOWEST: 0
EXACERBATED BY: KEYBOARDING
QUALITY: DULL ACHE
ALLEVIATING FACTORS: CHANGE IN POSITION
LOSS OF SENSATION IN FEET: 0
EXACERBATED BY: MOVEMENT
ALLEVIATING FACTORS: RELAXATION
OCCASIONAL FEELINGS OF UNSTEADINESS: 0
ALLEVIATING FACTORS: HEAT
PAIN SCALE AT HIGHEST: 10
ALLEVIATING FACTORS: REST
QUALITY: PULLING

## (undated) DEVICE — BUR, ROUND 3MM OSTEON, ELITE, SOFT TOUCH

## (undated) DEVICE — APPLICATOR, PREP, CHLORAPREP, W/ORANGE TINT, 10.5ML

## (undated) DEVICE — ELECTRODE, GROUND PLATE

## (undated) DEVICE — ELECTRODE, CORKSCREW NEEDLE 1.5M LENGTH

## (undated) DEVICE — COVER, TABLE, UHC

## (undated) DEVICE — DRESSING, ISLAND, ADHESIVE, TELFA, 4 X 8 IN

## (undated) DEVICE — DRESSING, ADHESIVE, ISLAND, TELFA, 2 X 3.75 IN, LF

## (undated) DEVICE — SUTURE, VICRYL, 3-0,18 IN, SH, UNDYED

## (undated) DEVICE — STAPLER, SKIN PROXIMATE, 35 WIDE

## (undated) DEVICE — CATHETER TRAY, SURESTEP, 16FR, URINE METER W/STATLOCK

## (undated) DEVICE — TUBING, SUCTION, CONNECTING, STERILE 0.25 X 120 IN., LF

## (undated) DEVICE — ADHESIVE, SKIN, DERMABOND ADVANCED, 15CM, PEN-STYLE

## (undated) DEVICE — SPONGE, DISSECTOR, PEANUT, 3/8, STERILE 5 FOAM HOLDER"

## (undated) DEVICE — ELECTRODE, ELECTROSURGICAL, BLADE, INSULATED, ENT/IMA, STERILE

## (undated) DEVICE — DRILL, 11MM

## (undated) DEVICE — REST, HEAD, BAGEL, 9 IN

## (undated) DEVICE — NEEDLE, ELECTRODE, SUBDERMAL, PAIRED, 2.0 LEAD, DISP

## (undated) DEVICE — Device

## (undated) DEVICE — SUTURE, SILK, 2-0, 18 IN, BLACK

## (undated) DEVICE — CLIPPER, SURGICAL BLADE ASSEMBLY, GENERAL PURPOSE, SINGLE USE

## (undated) DEVICE — SUTURE, VICRYL, 2-0, 18 IN CP-2, UNDYED

## (undated) DEVICE — SEALANT, HEMOSTATIC, FLOSEAL, 10 ML

## (undated) DEVICE — DRAPE, MICROSCOPE, W/REMOVABLE LENS

## (undated) DEVICE — LEGEND, LUB DIFFUSER PACK

## (undated) DEVICE — DRAIN, WOUND, ROUND, W/TROCAR, HOLE PATTERN, 10 IN, MEDIUM, 1/8 X 49 IN

## (undated) DEVICE — DRAPE, SHEET, FAN FOLDED, HALF, 44 X 58 IN, DISPOSABLE, LF, STERILE

## (undated) DEVICE — SPONGE, GAUZE, XRAY DECT, 16 PLY, 4 X 4, W/MASTER DMT,STERILE

## (undated) DEVICE — DRESSING, GAUZE, WASHED FLUFF, LARGE, STERILE

## (undated) DEVICE — SUTURE, ETHILON, 3-0, 30 IN, FS-1, BLACK

## (undated) DEVICE — MANIFOLD, 4 PORT NEPTUNE STANDARD

## (undated) DEVICE — COLLAR, CERVICAL, FORM FIT, MEDIUM DENSITY, SMALL LONG

## (undated) DEVICE — SPONGE, HEMOSTATIC, GELATIN, SURGIFOAM, 8 X 12.5 CM X 10 MM

## (undated) DEVICE — COVER, CART, 45 X 27 X 48 IN, CLEAR